# Patient Record
Sex: FEMALE | Race: WHITE | NOT HISPANIC OR LATINO | Employment: OTHER | URBAN - METROPOLITAN AREA
[De-identification: names, ages, dates, MRNs, and addresses within clinical notes are randomized per-mention and may not be internally consistent; named-entity substitution may affect disease eponyms.]

---

## 2017-01-20 ENCOUNTER — TRANSCRIBE ORDERS (OUTPATIENT)
Dept: ADMINISTRATIVE | Facility: HOSPITAL | Age: 77
End: 2017-01-20

## 2017-01-20 DIAGNOSIS — N18.6 ANEMIA IN END-STAGE RENAL DISEASE (HCC): Primary | ICD-10-CM

## 2017-01-20 DIAGNOSIS — D63.1 ANEMIA IN END-STAGE RENAL DISEASE (HCC): Primary | ICD-10-CM

## 2017-01-24 ENCOUNTER — HOSPITAL ENCOUNTER (OUTPATIENT)
Dept: INFUSION THERAPY | Facility: HOSPITAL | Age: 77
Discharge: HOME | End: 2017-01-24
Attending: INTERNAL MEDICINE | Admitting: INTERNAL MEDICINE

## 2017-01-24 VITALS
SYSTOLIC BLOOD PRESSURE: 165 MMHG | RESPIRATION RATE: 16 BRPM | TEMPERATURE: 98.7 F | OXYGEN SATURATION: 99 % | DIASTOLIC BLOOD PRESSURE: 67 MMHG | HEART RATE: 74 BPM

## 2017-01-24 DIAGNOSIS — I48.0 PAROXYSMAL ATRIAL FIBRILLATION (HCC): ICD-10-CM

## 2017-01-24 DIAGNOSIS — D63.1 ERYTHROPOIETIN DEFICIENCY ANEMIA: Primary | ICD-10-CM

## 2017-01-24 LAB
ABO GROUP BLD: NORMAL
BLD GP AB SCN SERPL QL: NEGATIVE
RH BLD: POSITIVE

## 2017-01-24 PROCEDURE — A9270 NON-COVERED ITEM OR SERVICE: HCPCS | Performed by: INTERNAL MEDICINE

## 2017-01-24 PROCEDURE — 63710000001 AMIODARONE 200 MG TABLET: Performed by: INTERNAL MEDICINE

## 2017-01-24 PROCEDURE — 86900 BLOOD TYPING SEROLOGIC ABO: CPT

## 2017-01-24 PROCEDURE — 36415 COLL VENOUS BLD VENIPUNCTURE: CPT

## 2017-01-24 PROCEDURE — G0463 HOSPITAL OUTPT CLINIC VISIT: HCPCS

## 2017-01-24 PROCEDURE — 36430 TRANSFUSION BLD/BLD COMPNT: CPT

## 2017-01-24 PROCEDURE — 86920 COMPATIBILITY TEST SPIN: CPT

## 2017-01-24 PROCEDURE — P9016 RBC LEUKOCYTES REDUCED: HCPCS

## 2017-01-24 PROCEDURE — 63710000001 HYDROCODONE-ACETAMINOPHEN 7.5-325 MG TABLET: Performed by: INTERNAL MEDICINE

## 2017-01-24 PROCEDURE — 86901 BLOOD TYPING SEROLOGIC RH(D): CPT

## 2017-01-24 PROCEDURE — 63710000001 FUROSEMIDE 40 MG TABLET: Performed by: INTERNAL MEDICINE

## 2017-01-24 PROCEDURE — 86850 RBC ANTIBODY SCREEN: CPT

## 2017-01-24 RX ORDER — AMIODARONE HYDROCHLORIDE 200 MG/1
200 TABLET ORAL ONCE
Status: COMPLETED | OUTPATIENT
Start: 2017-01-24 | End: 2017-01-24

## 2017-01-24 RX ORDER — HYDROCODONE BITARTRATE AND ACETAMINOPHEN 7.5; 325 MG/1; MG/1
1 TABLET ORAL EVERY 4 HOURS PRN
Status: CANCELLED
Start: 2017-01-24 | End: 2017-02-03

## 2017-01-24 RX ORDER — FUROSEMIDE 40 MG/1
40 TABLET ORAL ONCE
Status: CANCELLED
Start: 2017-01-24 | End: 2017-01-24

## 2017-01-24 RX ORDER — AMIODARONE HYDROCHLORIDE 200 MG/1
200 TABLET ORAL ONCE
Status: CANCELLED
Start: 2017-01-24 | End: 2017-01-24

## 2017-01-24 RX ORDER — FUROSEMIDE 40 MG/1
40 TABLET ORAL ONCE
Status: COMPLETED | OUTPATIENT
Start: 2017-01-24 | End: 2017-01-24

## 2017-01-24 RX ORDER — HYDROCODONE BITARTRATE AND ACETAMINOPHEN 7.5; 325 MG/1; MG/1
1 TABLET ORAL EVERY 4 HOURS PRN
Status: DISCONTINUED | OUTPATIENT
Start: 2017-01-24 | End: 2017-01-26 | Stop reason: HOSPADM

## 2017-01-24 RX ADMIN — AMIODARONE HYDROCHLORIDE 200 MG: 200 TABLET ORAL at 11:54

## 2017-01-24 RX ADMIN — HYDROCODONE BITARTRATE AND ACETAMINOPHEN 1 TABLET: 7.5; 325 TABLET ORAL at 11:53

## 2017-01-24 RX ADMIN — FUROSEMIDE 40 MG: 40 TABLET ORAL at 11:54

## 2017-01-24 NOTE — IP AVS SNAPSHOT
[facility] CARE  [phone]                    Brooklyn Galvan   1/24/2017  7:30 AM   BLOOD TRANSFUSION    Provider:  [facility]   Department:  [facility] CARE   Dept Phone:  [phone]                Your Full Care Plan           Instructions    Blood Transfusion, Care After  These instructions give you information about caring for yourself after your procedure. Your doctor may also give you more specific instructions. Call your doctor if you have any problems or questions after your procedure.   HOME CARE  · Take medicines only as told by your doctor. Ask your doctor if you can take an over-the-counter pain reliever if you have a fever or headache a day or two after your procedure.  · Return to your normal activities as told by your doctor.  GET HELP IF:   · You develop redness or irritation at your IV site.  · You have a fever, chills, or a headache that does not go away.  · Your pee (urine) is darker than normal.  · Your urine turns:    Pink.    Red.    Brown.  · The white part of your eye turns yellow (jaundice).  · You feel weak after doing your normal activities.  GET HELP RIGHT AWAY IF:   · You have trouble breathing.  · You have fever and chills and you also have:    Anxiety.    Chest or back pain.    Flushed or pink skin.    Clammy or sweaty skin.    A fast heartbeat.    A sick feeling in your stomach (nausea).     This information is not intended to replace advice given to you by your health care provider. Make sure you discuss any questions you have with your health care provider.     Document Released: 01/08/2016 Document Reviewed: 01/08/2016  Elsevier Interactive Patient Education ©2016 [employer]            Your Updated Medication List      ASK your doctor about these medications     amiodarone 200 MG tablet   Commonly known as:  PACERONE       aspirin 81 MG tablet       atorvastatin 10 MG tablet   Commonly known as:  LIPITOR       b  complex-C-folic acid 1 MG capsule       bimatoprost 0.01 % ophthalmic drops   Commonly known as:  LUMIGAN       bisacodyl 5 MG EC tablet   Commonly known as:  DULCOLAX       carvedilol 6.25 MG tablet   Commonly known as:  COREG       furosemide 40 MG tablet   Commonly known as:  LASIX       lisinopril 5 MG tablet   Commonly known as:  PRINIVIL,ZESTRIL       LORazepam 1 MG tablet   Commonly known as:  ATIVAN   Take 1 tablet by mouth 2 (Two) Times a Day As Needed for anxiety. 1/2 to 1 in am and 1 pm prn anxiety.       PARoxetine 40 MG tablet   Commonly known as:  PAXIL       triamcinolone 0.1 % cream   Commonly known as:  KENALOG   Wipe bottom with bid as needed hemorrhoids       Vitamin D3 52664 UNITS capsule       warfarin 5 MG tablet   Commonly known as:  COUMADIN               [employer] Signup     Our records indicate that your [employer] account has been deactivated. If you would like to reactivate your account, please email [email or web address] or call [phone] to talk to our [employer] staff.             Other Info from Your Visit           Allergies     Iodinated Diagnostic Agents Hives    Penicillins Swelling    Acetaminophen-codeine     Sulfa Antibiotics     Acetaminophen Anxiety    Promethazine Anxiety      Reason for Visit     Outpatient Infusion blood transfusions      Vital Signs     Blood Pressure Pulse Temperature Respirations Smoking Status       145/41 78 98.1 °F (36.7 °C) (Oral) 20 Former Smoker       Discharge Instructions     None         SYMPTOMS OF A STROKE    Call 911 or have someone take you to the Emergency Department if you have any of the following:    · Sudden numbness or weakness of your face, arm or leg especially on one side of the body  · Sudden confusion, diffiiculty speaking or trouble understanding   · Changes in your vision or loss of sight in one eye  · Sudden severe headache with no known cause  · sudden dizziness, trouble walking, loss of balance or  coordination    It is important to seek emergency care right away if you have further stroke symptoms. If you get emergency help quickly, the powerful clot-dissolving medicines can reduce the disabilities caused by a stroke.     For more information:    American Stroke Association  8-183-6-STROKE  www.strokeassociation.org           IF YOU SMOKE OR USE TOBACCO PLEASE READ THE FOLLOWING:    Why is smoking bad for me?  Smoking increases the risk of heart disease, lung disease, vascular disease, stroke, and cancer.     If you smoke, STOP!    If you would like more information on quitting smoking, please visit the [employer] website: [email or web address]   This link will provide additional resources including the QUIT line and the [employer] support groups.     For more information:    American Cancer Society  ([phone] [phone]    American Heart Association  2-449-298-3132

## 2017-01-24 NOTE — PATIENT INSTRUCTIONS
Blood Transfusion, Care After  These instructions give you information about caring for yourself after your procedure. Your doctor may also give you more specific instructions. Call your doctor if you have any problems or questions after your procedure.   HOME CARE  · Take medicines only as told by your doctor. Ask your doctor if you can take an over-the-counter pain reliever if you have a fever or headache a day or two after your procedure.  · Return to your normal activities as told by your doctor.  GET HELP IF:   · You develop redness or irritation at your IV site.  · You have a fever, chills, or a headache that does not go away.  · Your pee (urine) is darker than normal.  · Your urine turns:    Pink.    Red.    Brown.  · The white part of your eye turns yellow (jaundice).  · You feel weak after doing your normal activities.  GET HELP RIGHT AWAY IF:   · You have trouble breathing.  · You have fever and chills and you also have:    Anxiety.    Chest or back pain.    Flushed or pink skin.    Clammy or sweaty skin.    A fast heartbeat.    A sick feeling in your stomach (nausea).     This information is not intended to replace advice given to you by your health care provider. Make sure you discuss any questions you have with your health care provider.     Document Released: 01/08/2016 Document Reviewed: 01/08/2016  [employer] Interactive Patient Education ©2016 [employer]

## 2017-01-24 NOTE — PROGRESS NOTES
Tolerated both transfusions with no concerns or evidence of reaction. Medicated once for pain in back; see MAR for details. Report called to Maryann at [facility] at 1500. Discharged home per wheel chair with daughter at 1510.

## 2017-01-25 LAB
ABO + RH BLD: NORMAL
ABO + RH BLD: NORMAL
BH BB BLOOD EXPIRATION DATE: NORMAL
BH BB BLOOD EXPIRATION DATE: NORMAL
BH BB BLOOD TYPE BARCODE: 5100
BH BB BLOOD TYPE BARCODE: 5100
BH BB DISPENSE STATUS: NORMAL
BH BB DISPENSE STATUS: NORMAL
BH BB PRODUCT CODE: NORMAL
BH BB PRODUCT CODE: NORMAL
BH BB UNIT NUMBER: NORMAL
BH BB UNIT NUMBER: NORMAL
CROSSMATCH INTERPRETATION: NORMAL
CROSSMATCH INTERPRETATION: NORMAL
UNIT  ABO: NORMAL
UNIT  ABO: NORMAL
UNIT  RH: NORMAL
UNIT  RH: NORMAL

## 2017-02-01 ENCOUNTER — TELEPHONE (OUTPATIENT)
Dept: FAMILY MEDICINE CLINIC | Facility: CLINIC | Age: 77
End: 2017-02-01

## 2017-02-01 RX ORDER — WARFARIN SODIUM 3 MG/1
TABLET ORAL
Qty: 30 TABLET | Refills: 3 | Status: SHIPPED | OUTPATIENT
Start: 2017-02-01 | End: 2017-07-16 | Stop reason: SDUPTHER

## 2017-02-01 NOTE — TELEPHONE ENCOUNTER
Shiloh from [facility] called today and said pts PT/INR is 19.7-1.6.  Pt has been taking 3mg coumadin daily.  Per Negin, pt is to take 5mg for 2 days and 3mg all the rest and recheck in 1 week.  [facility] also said they would like an order to do a CBC on pt because her HGB was 5 when she was in the hospital and at discharge it was a 7.  Negin gave the okay to check CBC. Shiloh from [facility] is aware of this.

## 2017-03-09 ENCOUNTER — TELEPHONE (OUTPATIENT)
Dept: FAMILY MEDICINE CLINIC | Facility: CLINIC | Age: 77
End: 2017-03-09

## 2017-03-13 ENCOUNTER — OFFICE VISIT (OUTPATIENT)
Dept: FAMILY MEDICINE CLINIC | Facility: CLINIC | Age: 77
End: 2017-03-13

## 2017-03-13 VITALS
HEART RATE: 75 BPM | DIASTOLIC BLOOD PRESSURE: 50 MMHG | OXYGEN SATURATION: 95 % | TEMPERATURE: 98.4 F | SYSTOLIC BLOOD PRESSURE: 84 MMHG

## 2017-03-13 DIAGNOSIS — I42.8 NONISCHEMIC CARDIOMYOPATHY (HCC): ICD-10-CM

## 2017-03-13 DIAGNOSIS — Z99.2 END-STAGE RENAL DISEASE ON HEMODIALYSIS (HCC): ICD-10-CM

## 2017-03-13 DIAGNOSIS — N18.5 ANEMIA OF CHRONIC RENAL FAILURE, STAGE 5 (HCC): ICD-10-CM

## 2017-03-13 DIAGNOSIS — E11.22 TYPE 2 DIABETES MELLITUS WITH CHRONIC KIDNEY DISEASE ON CHRONIC DIALYSIS, WITHOUT LONG-TERM CURRENT USE OF INSULIN (HCC): ICD-10-CM

## 2017-03-13 DIAGNOSIS — M86.9 OSTEOMYELITIS OF METATARSAL (HCC): ICD-10-CM

## 2017-03-13 DIAGNOSIS — D63.1 ANEMIA OF CHRONIC RENAL FAILURE, STAGE 5 (HCC): ICD-10-CM

## 2017-03-13 DIAGNOSIS — Z01.818 PREOPERATIVE CLEARANCE: Primary | ICD-10-CM

## 2017-03-13 DIAGNOSIS — N18.6 TYPE 2 DIABETES MELLITUS WITH CHRONIC KIDNEY DISEASE ON CHRONIC DIALYSIS, WITHOUT LONG-TERM CURRENT USE OF INSULIN (HCC): ICD-10-CM

## 2017-03-13 DIAGNOSIS — I10 BENIGN ESSENTIAL HYPERTENSION: ICD-10-CM

## 2017-03-13 DIAGNOSIS — I48.0 PAROXYSMAL ATRIAL FIBRILLATION (HCC): ICD-10-CM

## 2017-03-13 DIAGNOSIS — Z99.2 TYPE 2 DIABETES MELLITUS WITH CHRONIC KIDNEY DISEASE ON CHRONIC DIALYSIS, WITHOUT LONG-TERM CURRENT USE OF INSULIN (HCC): ICD-10-CM

## 2017-03-13 DIAGNOSIS — N18.6 END-STAGE RENAL DISEASE ON HEMODIALYSIS (HCC): ICD-10-CM

## 2017-03-13 DIAGNOSIS — I27.20 PULMONARY HYPERTENSION (HCC): ICD-10-CM

## 2017-03-13 DIAGNOSIS — I34.0 NON-RHEUMATIC MITRAL REGURGITATION: ICD-10-CM

## 2017-03-13 DIAGNOSIS — Z79.01 CHRONIC ANTICOAGULATION: ICD-10-CM

## 2017-03-13 DIAGNOSIS — I25.10 CORONARY ARTERY DISEASE INVOLVING NATIVE CORONARY ARTERY OF NATIVE HEART WITHOUT ANGINA PECTORIS: ICD-10-CM

## 2017-03-13 DIAGNOSIS — G47.33 OSA (OBSTRUCTIVE SLEEP APNEA): ICD-10-CM

## 2017-03-13 PROCEDURE — 99214 OFFICE O/P EST MOD 30 MIN: CPT | Performed by: FAMILY MEDICINE

## 2017-03-13 RX ORDER — AMPICILLIN 500 MG/1
500 CAPSULE ORAL 4 TIMES DAILY
COMMUNITY
End: 2017-06-29

## 2017-03-13 RX ORDER — LIDOCAINE 50 MG/G
1 PATCH TOPICAL EVERY 24 HOURS
COMMUNITY
End: 2017-06-29

## 2017-03-13 RX ORDER — LISINOPRIL 20 MG/1
20 TABLET ORAL DAILY
COMMUNITY
End: 2017-06-29 | Stop reason: ALTCHOICE

## 2017-03-13 RX ORDER — CALCIUM ACETATE 667 MG/1
CAPSULE ORAL
Refills: 5 | COMMUNITY
Start: 2017-01-20 | End: 2017-06-29 | Stop reason: ALTCHOICE

## 2017-03-13 RX ORDER — HYDROCODONE BITARTRATE AND ACETAMINOPHEN 7.5; 325 MG/1; MG/1
1 TABLET ORAL EVERY 6 HOURS PRN
COMMUNITY
End: 2017-06-29 | Stop reason: DRUGHIGH

## 2017-03-13 RX ORDER — DOCUSATE SODIUM 100 MG/1
100 CAPSULE, LIQUID FILLED ORAL 2 TIMES DAILY
COMMUNITY
End: 2017-06-22 | Stop reason: SDUPTHER

## 2017-03-13 NOTE — PROGRESS NOTES
Subjective   Brooklyn Galvan is a 77 y.o. female. Presents today for   Chief Complaint   Patient presents with   • Surgical Clearance     pt is here for surgical clearance for left foot surgery.   Patient here for surgical clearance.    Surgery:  Left foot metarsal osteomyelitis and non-healing diabetic ulcer.  Surgeon:  Dr. Harpal Maria  Tentatively scheduled Thursday, 3/16/17    Patient with history of Type 2 diabetes, diet controlled, hypertension, dyslipidemia and end stage renal disease on hemodialysis for past 5 years with runs on M, W & F, BP down today but relates just left dialysis and taking larger volume off (lower dry weight).   Patient history of a fib on chronic anticoagulation with recent cessation as to have surgery in 4 days.  Last Coumadin dose 3/10/17.  Patient recently inpatient at [facility] for hypoxic respiratory failure though to be secondary to pneumonia.  However was found to be in CHF with pulmonary edema felt to be multifactorial.  This resolved after dialysis with large volume dialyzed.  Was discharged with plan to keep lower dry weight.  Did have borderline troponin peaked at 0.28 and Cardiology was consulted.  It opted to perform left and right heart catheterization on 2/27/2017.  No significant coronary artery pathology noted (Normal left main, left circumflex, right coronary and LAD;  First Diagonal had 60% stenosis, had significant 4+ mitral regurgitation with markedly dilated left atrium).  Moderate pulmonary hypertension with RVSP 57mmHg noted as well.  LV EF 20-30%.  Patient was also noted to have ESBL in urine, was treated with merrem, though patient asx and coloncy count 20-30k.  Prior to this admission patient also had admit December 2016 for rectal bleeding while on coumadin.  She did have a colonoscopy that noted ileocecal mass and multiple polyps.  There was a microperforation noted at bx site and ultimately had right sided belle-colectomy.  Pathology per recent d/c notes was  tubular adenoma high grade dysplasia.  Patient was transfused PRBC at that time     Patient currently denying chest pain, SOA, PND or orthopnea.  Also denies syncope, heart racing and no light headed or dizzy sensation.  No abdominal pain or nausea or vomiting.  No diarrhea.  No current urinary symptoms of dysuria or burning with urination (had UTI recent hospitalization).    Patient has had prior general anesthesia without issues.  Patient has ANDREA with moderate pulmonary hypertension, does not wear cpap at night.  No history of DVT/VTE or liver disease. No prior AMI or CVA.        History of Present Illness    Review of Systems   Constitutional: Negative for chills and fever.   HENT: Negative for congestion, ear pain and sinus pressure.    Eyes: Negative for pain and visual disturbance.   Respiratory: Negative for cough, shortness of breath and wheezing.    Cardiovascular: Negative for chest pain, palpitations and leg swelling.   Gastrointestinal: Negative for abdominal pain, blood in stool, constipation, diarrhea, nausea and vomiting.   Endocrine: Negative for polydipsia and polyuria.   Genitourinary: Negative for dysuria and frequency.   Musculoskeletal: Negative for arthralgias and joint swelling.   Skin: Positive for wound. Negative for rash.   Neurological: Negative for dizziness, syncope, facial asymmetry, speech difficulty, weakness, light-headedness and headaches.   Hematological: Negative for adenopathy. Bruises/bleeds easily (with coumadin).   Psychiatric/Behavioral: Negative for sleep disturbance. The patient is not nervous/anxious.        The following portions of the patient's history were reviewed and updated as appropriate: allergies, current medications, past family history, past medical history, past social history, past surgical history and problem list.    Patient Active Problem List   Diagnosis   • Vitamin D deficiency   • Anemia of chronic renal failure   • Benign essential hypertension   •  Chronic kidney disease, stage V   • Constipation   • Type 2 diabetes mellitus with chronic kidney disease on chronic dialysis, without long-term current use of insulin   • Dyslipidemia   • Generalized anxiety disorder   • Low back pain   • Post-menopausal osteoporosis   • End-stage renal disease on hemodialysis   • Paroxysmal atrial fibrillation   • Coronary artery disease involving native coronary artery   • Chronic anticoagulation   • Non-rheumatic mitral regurgitation   • Pulmonary hypertension   • ANDREA (obstructive sleep apnea)   • Nonischemic cardiomyopathy       Allergies   Allergen Reactions   • Iodinated Diagnostic Agents Hives   • Penicillins Swelling   • Acetaminophen-Codeine    • Sulfa Antibiotics    • Acetaminophen Anxiety   • Promethazine Anxiety       Current Outpatient Prescriptions on File Prior to Visit   Medication Sig Dispense Refill   • amiodarone (PACERONE) 200 MG tablet      • atorvastatin (LIPITOR) 10 MG tablet Take 10 mg by mouth daily. ONE QD FOR CHOLESTEROL & HEART HEALTH     • b complex-C-folic acid 1 MG capsule Take 1 capsule by mouth daily.     • bimatoprost (LUMIGAN) 0.01 % ophthalmic drops Apply 1 drop to eye daily.     • bisacodyl (DULCOLAX) 5 MG EC tablet Take by mouth. ONE TID AS NEEDED     • carvedilol (COREG) 6.25 MG tablet 6.25 mg 2 (Two) Times a Day With Meals.     • Cholecalciferol (VITAMIN D3) 86905 UNITS capsule Take 50,000 Units by mouth every 7 days. \     • furosemide (LASIX) 40 MG tablet Take 40 mg by mouth 2 (two) times a day.     • LORazepam (ATIVAN) 1 MG tablet Take 1 tablet by mouth 2 (Two) Times a Day As Needed for anxiety. 1/2 to 1 in am and 1 pm prn anxiety. 60 tablet 2   • warfarin (COUMADIN) 3 MG tablet Use as directed by prescriber. 30 tablet 3   • warfarin (COUMADIN) 5 MG tablet      • [DISCONTINUED] aspirin 81 MG tablet Take 1 tablet by mouth daily.     • [DISCONTINUED] lisinopril (PRINIVIL,ZESTRIL) 5 MG tablet      • [DISCONTINUED] PARoxetine (PAXIL) 40 MG  tablet Take by mouth. ONE & ONE-HALF QD FOR MOOD AND WELL-BEING     • [DISCONTINUED] triamcinolone (KENALOG) 0.1 % cream Wipe bottom with bid as needed hemorrhoids 45 g 5     No current facility-administered medications on file prior to visit.      Past Medical History   Diagnosis Date   • Anemia    • Anxiety disorder    • Chronic kidney disease    • Depressive disorder    • Diabetes mellitus    • Dialysis patient    • Glaucoma    •  2 para 2    • Hyperlipidemia    • Menarche    • MRSA (methicillin resistant staph aureus) culture positive    • UTI (urinary tract infection)    • Vitamin D deficiency          Objective   Vitals:    17 1231   BP: (!) 84/50   BP Location: Left arm   Patient Position: Sitting   Cuff Size: Large Adult   Pulse: 75   Temp: 98.4 °F (36.9 °C)   TempSrc: Oral   SpO2: 95%       Physical Exam   Constitutional: She appears well-developed and well-nourished.   HENT:   Head: Normocephalic and atraumatic.   Right Ear: Tympanic membrane normal.   Left Ear: Tympanic membrane normal.   Nose: Nose normal.   Mouth/Throat: Oropharynx is clear and moist and mucous membranes are normal.   Eyes: EOM are normal. Pupils are equal, round, and reactive to light.   Neck: Neck supple. No JVD present. No thyromegaly present.   Cardiovascular: Normal rate and regular rhythm.  Exam reveals no gallop and no friction rub.    Murmur heard.  Pulmonary/Chest: Effort normal and breath sounds normal. No respiratory distress. She has no wheezes. She has no rales.   Abdominal: Soft. Bowel sounds are normal. She exhibits no distension. There is no tenderness. There is no rebound and no guarding.   Musculoskeletal: She exhibits no edema.   Neurological: She is alert. No cranial nerve deficit. Coordination normal.   Skin: Skin is warm and dry.   Psychiatric: She has a normal mood and affect. Her behavior is normal.   Nursing note and vitals reviewed.    RADIOLOGY REPORT        FACILITY:  [facility]     UNIT/AGE/GENDER: JERIRAD  OP      AGE:77 Y          SEX:F    PATIENT NAME/:  ULI MILAN B    1940    UNIT NUMBER:  [id number]    ACCOUNT NUMBER:  [id number]    ACCESSION NUMBER:  [id number]        EXAMINATION: XR FOOT COMP MIN 3 VWS LT        DATE: 2017        HISTORY: Left lateral foot ulcer.        COMPARISON: MRI dated 2016        FINDINGS: 3 nonweightbearing views of the left foot demonstrate an ulcer of the lateral forefoot. Dressing material is in place which obscures underlying fine bone detail. There is deficiency of the 5th metatarsal head. This may be postsurgical or the     sequelae of prior osteomyelitis. There is subtle irregularity of the distal margin which is in keeping with ongoing osteomyelitis. There has been prior amputation of the 2nd toe. There is metatarsus primus varus and hallux valgus with lateral subluxation    of the 1st metatarsophalangeal joint. There is moderate midfoot osteoarthritis. Heterotopic ossification is present at the Achilles insertion. A large heel spur is noted.        IMPRESSION:     1. Lateral forefoot ulcer. There is deficiency of the underlying 5th metatarsal head which may be postsurgical or the sequelae of prior osteomyelitis. There is subtle irregularity of the distal margin in keeping with ongoing osteomyelitis.    2. Prior amputation of the 2nd toe.    3. Metatarsus primus varus and hallux valgus with lateral 1st metatarsophalangeal joint subluxation.    4. Moderate midfoot osteoarthritis.        Dictated by: Cody Dickinson M.D.        Images and Report reviewed and interpreted by: Cody Dickinson M.D.        <PS><Electronically signed by: Cody Dickinson M.D.>    2017 0941    Assessment/Plan   Uli was seen today for surgical clearance.    Diagnoses and all orders for this visit:    Preoperative clearance  -     Comprehensive Metabolic Panel  -     CBC & Differential  -     Protime-INR  -     APTT    Paroxysmal atrial  fibrillation  -     Protime-INR  -     APTT    Benign essential hypertension    Anemia of chronic renal failure, stage 5  -     CBC & Differential    End-stage renal disease on hemodialysis  -     CBC & Differential    Type 2 diabetes mellitus with chronic kidney disease on chronic dialysis, without long-term current use of insulin  -     Hemoglobin A1c    Osteomyelitis of metatarsal  -     Comprehensive Metabolic Panel  -     CBC & Differential  -     Protime-INR  -     APTT    Chronic anticoagulation  -     CBC & Differential  -     Protime-INR  -     APTT    Non-rheumatic mitral regurgitation    Nonischemic cardiomyopathy    ANDREA (obstructive sleep apnea)    Pulmonary hypertension    Patient is due for recheck of labs and will check preoperatively including coags.   Patient is high risk with multiple risk factors, however has osteomyelitis and unlikely to heal placing at high risk for limb loss.  As long as labs stable she is medically cleared for surgery.  Patient does have untreated ANDREA with pulmonary hypertension and I would notify anesthesia of this.  I also recommend clear with Cardiology any perioperative recommendations regarding cardiac risks as has significant mitral valvular regurgitation reported.         -Follow up: 3 months       Current Outpatient Prescriptions:   •  amiodarone (PACERONE) 200 MG tablet, , Disp: , Rfl:   •  ampicillin (PRINCIPEN) 500 MG capsule, Take 500 mg by mouth 4 (Four) Times a Day., Disp: , Rfl:   •  atorvastatin (LIPITOR) 10 MG tablet, Take 10 mg by mouth daily. ONE QD FOR CHOLESTEROL & HEART HEALTH, Disp: , Rfl:   •  b complex-C-folic acid 1 MG capsule, Take 1 capsule by mouth daily., Disp: , Rfl:   •  bimatoprost (LUMIGAN) 0.01 % ophthalmic drops, Apply 1 drop to eye daily., Disp: , Rfl:   •  bisacodyl (DULCOLAX) 5 MG EC tablet, Take by mouth. ONE TID AS NEEDED, Disp: , Rfl:   •  calcium acetate (PHOS BINDER,) 667 MG capsule capsule, TAKE 1 CAPSULE BY MOUTH THREE TIMES DAILY  WITH MEALS, Disp: , Rfl: 5  •  carvedilol (COREG) 6.25 MG tablet, 6.25 mg 2 (Two) Times a Day With Meals., Disp: , Rfl:   •  Cholecalciferol (VITAMIN D3) 86021 UNITS capsule, Take 50,000 Units by mouth every 7 days. \, Disp: , Rfl:   •  docusate sodium (COLACE) 100 MG capsule, Take 100 mg by mouth 2 (Two) Times a Day., Disp: , Rfl:   •  Enoxaparin Sodium (LOVENOX SC), Inject  under the skin., Disp: , Rfl:   •  furosemide (LASIX) 40 MG tablet, Take 40 mg by mouth 2 (two) times a day., Disp: , Rfl:   •  HYDROcodone-acetaminophen (NORCO) 7.5-325 MG per tablet, Take 1 tablet by mouth Every 6 (Six) Hours As Needed for moderate pain (4-6)., Disp: , Rfl:   •  lidocaine (LIDODERM) 5 %, Place 1 patch on the skin Daily. Remove & Discard patch within 12 hours or as directed by MD, Disp: , Rfl:   •  lisinopril (PRINIVIL,ZESTRIL) 20 MG tablet, Take 20 mg by mouth Daily., Disp: , Rfl:   •  LORazepam (ATIVAN) 1 MG tablet, Take 1 tablet by mouth 2 (Two) Times a Day As Needed for anxiety. 1/2 to 1 in am and 1 pm prn anxiety., Disp: 60 tablet, Rfl: 2  •  MEROPENEM IV, Infuse  into a venous catheter., Disp: , Rfl:   •  PARoxetine HCl (PAXIL PO), Take 60 mg by mouth Daily., Disp: , Rfl:   •  warfarin (COUMADIN) 3 MG tablet, Use as directed by prescriber., Disp: 30 tablet, Rfl: 3  •  warfarin (COUMADIN) 5 MG tablet, , Disp: , Rfl:

## 2017-03-14 LAB
ALBUMIN SERPL-MCNC: 4.1 G/DL (ref 3.5–5.2)
ALBUMIN/GLOB SERPL: 1.5 G/DL
ALP SERPL-CCNC: 110 U/L (ref 39–117)
ALT SERPL-CCNC: 33 U/L (ref 1–33)
APTT PPP: 33 SECONDS (ref 22.7–35.4)
AST SERPL-CCNC: 34 U/L (ref 1–32)
BASOPHILS # BLD AUTO: 0.05 10*3/MM3 (ref 0–0.2)
BASOPHILS NFR BLD AUTO: 0.5 % (ref 0–1.5)
BILIRUB SERPL-MCNC: 0.7 MG/DL (ref 0.1–1.2)
BUN SERPL-MCNC: 28 MG/DL (ref 8–23)
BUN/CREAT SERPL: 4.2 (ref 7–25)
CALCIUM SERPL-MCNC: 9.1 MG/DL (ref 8.6–10.5)
CHLORIDE SERPL-SCNC: 97 MMOL/L (ref 98–107)
CO2 SERPL-SCNC: 21.2 MMOL/L (ref 22–29)
CREAT SERPL-MCNC: 6.68 MG/DL (ref 0.57–1)
DIFFERENTIAL COMMENT: NORMAL
EOSINOPHIL # BLD AUTO: 0.12 10*3/MM3 (ref 0–0.7)
EOSINOPHIL NFR BLD AUTO: 1.1 % (ref 0.3–6.2)
ERYTHROCYTE [DISTWIDTH] IN BLOOD BY AUTOMATED COUNT: 21.8 % (ref 11.7–13)
GLOBULIN SER CALC-MCNC: 2.8 GM/DL
GLUCOSE SERPL-MCNC: 108 MG/DL (ref 65–99)
HBA1C MFR BLD: 4.7 % (ref 4.8–5.6)
HCT VFR BLD AUTO: 37.6 % (ref 35.6–45.5)
HGB BLD-MCNC: 11.7 G/DL (ref 11.9–15.5)
IMM GRANULOCYTES # BLD: 0.03 10*3/MM3 (ref 0–0.03)
IMM GRANULOCYTES NFR BLD: 0.3 % (ref 0–0.5)
LYMPHOCYTES # BLD AUTO: 1.44 10*3/MM3 (ref 0.9–4.8)
LYMPHOCYTES NFR BLD AUTO: 13.7 % (ref 19.6–45.3)
MCH RBC QN AUTO: 31.7 PG (ref 26.9–32)
MCHC RBC AUTO-ENTMCNC: 31.1 G/DL (ref 32.4–36.3)
MCV RBC AUTO: 101.9 FL (ref 80.5–98.2)
MONOCYTES # BLD AUTO: 0.84 10*3/MM3 (ref 0.2–1.2)
MONOCYTES NFR BLD AUTO: 8 % (ref 5–12)
NEUTROPHILS # BLD AUTO: 8.02 10*3/MM3 (ref 1.9–8.1)
NEUTROPHILS NFR BLD AUTO: 76.4 % (ref 42.7–76)
NRBC BLD AUTO-RTO: 2.1 /100 WBC (ref 0–0)
PLATELET # BLD AUTO: 178 10*3/MM3 (ref 140–500)
PLATELET BLD QL SMEAR: NORMAL
POTASSIUM SERPL-SCNC: 4.8 MMOL/L (ref 3.5–5.2)
PROT SERPL-MCNC: 6.9 G/DL (ref 6–8.5)
RBC # BLD AUTO: 3.69 10*6/MM3 (ref 3.9–5.2)
RBC MORPH BLD: NORMAL
SODIUM SERPL-SCNC: 142 MMOL/L (ref 136–145)
WBC # BLD AUTO: 10.5 10*3/MM3 (ref 4.5–10.7)

## 2017-03-15 ENCOUNTER — TELEPHONE (OUTPATIENT)
Dept: FAMILY MEDICINE CLINIC | Facility: CLINIC | Age: 77
End: 2017-03-15

## 2017-03-15 NOTE — TELEPHONE ENCOUNTER
----- Message from Michael Hernadez DO sent at 3/14/2017  8:56 PM EDT -----  Please fax labs to Dr. Maria for surgery 3/16/17 and let patient know anemia improved.  Hgb went from 8 to over 11.

## 2017-03-15 NOTE — PROGRESS NOTES
Please fax labs to Dr. Maria for surgery 3/16/17 and let patient know anemia improved.  Hgb went from 8 to over 11.

## 2017-05-26 ENCOUNTER — TELEPHONE (OUTPATIENT)
Dept: FAMILY MEDICINE CLINIC | Facility: CLINIC | Age: 77
End: 2017-05-26

## 2017-06-21 ENCOUNTER — TELEPHONE (OUTPATIENT)
Dept: FAMILY MEDICINE CLINIC | Facility: CLINIC | Age: 77
End: 2017-06-21

## 2017-06-21 DIAGNOSIS — F41.9 ANXIETY: ICD-10-CM

## 2017-06-21 RX ORDER — LORAZEPAM 1 MG/1
1 TABLET ORAL 2 TIMES DAILY PRN
Qty: 60 TABLET | Refills: 2 | OUTPATIENT
Start: 2017-06-21 | End: 2017-12-07 | Stop reason: SDUPTHER

## 2017-06-22 RX ORDER — DOCUSATE SODIUM 100 MG/1
CAPSULE ORAL
Qty: 60 CAPSULE | Refills: 10 | Status: SHIPPED | OUTPATIENT
Start: 2017-06-22 | End: 2018-07-05 | Stop reason: SDUPTHER

## 2017-06-29 ENCOUNTER — OFFICE VISIT (OUTPATIENT)
Dept: FAMILY MEDICINE CLINIC | Facility: CLINIC | Age: 77
End: 2017-06-29

## 2017-06-29 VITALS
SYSTOLIC BLOOD PRESSURE: 144 MMHG | DIASTOLIC BLOOD PRESSURE: 62 MMHG | BODY MASS INDEX: 33.59 KG/M2 | WEIGHT: 209 LBS | OXYGEN SATURATION: 97 % | TEMPERATURE: 98.3 F | HEART RATE: 65 BPM | HEIGHT: 66 IN

## 2017-06-29 DIAGNOSIS — E78.5 DYSLIPIDEMIA: ICD-10-CM

## 2017-06-29 DIAGNOSIS — N18.5 CHRONIC KIDNEY DISEASE, STAGE V (HCC): ICD-10-CM

## 2017-06-29 DIAGNOSIS — Z12.39 BREAST CANCER SCREENING: ICD-10-CM

## 2017-06-29 DIAGNOSIS — Z12.31 ENCOUNTER FOR SCREENING MAMMOGRAM FOR MALIGNANT NEOPLASM OF BREAST: ICD-10-CM

## 2017-06-29 DIAGNOSIS — E11.22 TYPE 2 DIABETES MELLITUS WITH CHRONIC KIDNEY DISEASE ON CHRONIC DIALYSIS, WITHOUT LONG-TERM CURRENT USE OF INSULIN (HCC): ICD-10-CM

## 2017-06-29 DIAGNOSIS — Z99.2 TYPE 2 DIABETES MELLITUS WITH CHRONIC KIDNEY DISEASE ON CHRONIC DIALYSIS, WITHOUT LONG-TERM CURRENT USE OF INSULIN (HCC): ICD-10-CM

## 2017-06-29 DIAGNOSIS — F41.9 ANXIETY: Primary | ICD-10-CM

## 2017-06-29 DIAGNOSIS — N18.6 TYPE 2 DIABETES MELLITUS WITH CHRONIC KIDNEY DISEASE ON CHRONIC DIALYSIS, WITHOUT LONG-TERM CURRENT USE OF INSULIN (HCC): ICD-10-CM

## 2017-06-29 DIAGNOSIS — I25.10 CORONARY ARTERY DISEASE INVOLVING NATIVE CORONARY ARTERY OF NATIVE HEART WITHOUT ANGINA PECTORIS: ICD-10-CM

## 2017-06-29 DIAGNOSIS — I10 BENIGN ESSENTIAL HYPERTENSION: ICD-10-CM

## 2017-06-29 PROBLEM — E11.69 OSTEOMYELITIS DUE TO TYPE 2 DIABETES MELLITUS (HCC): Status: ACTIVE | Noted: 2017-03-16

## 2017-06-29 PROBLEM — M86.9 OSTEOMYELITIS DUE TO TYPE 2 DIABETES MELLITUS (HCC): Status: ACTIVE | Noted: 2017-03-16

## 2017-06-29 PROBLEM — E66.9 ADIPOSITY: Status: ACTIVE | Noted: 2017-06-29

## 2017-06-29 PROCEDURE — 99213 OFFICE O/P EST LOW 20 MIN: CPT | Performed by: FAMILY MEDICINE

## 2017-06-29 RX ORDER — GLIMEPIRIDE 2 MG/1
1 TABLET ORAL 2 TIMES DAILY
COMMUNITY

## 2017-06-29 RX ORDER — SEVELAMER CARBONATE 800 MG/1
800 TABLET, FILM COATED ORAL
COMMUNITY

## 2017-06-29 RX ORDER — HYDROCODONE BITARTRATE AND ACETAMINOPHEN 5; 325 MG/1; MG/1
1 TABLET ORAL EVERY 6 HOURS PRN
COMMUNITY
End: 2017-09-07 | Stop reason: SDUPTHER

## 2017-06-29 RX ORDER — ERGOCALCIFEROL 1.25 MG/1
50000 CAPSULE ORAL WEEKLY
COMMUNITY

## 2017-06-29 RX ORDER — LEVOTHYROXINE SODIUM 0.03 MG/1
25 TABLET ORAL DAILY
COMMUNITY
End: 2017-09-07

## 2017-06-29 NOTE — PROGRESS NOTES
Subjective   Brooklyn Galvan is a 77 y.o. female. Presents today for   Chief Complaint   Patient presents with   • Anxiety     pt here for medication refill   • Hypotension     pt was in hospital last month for hypotension, records are in media.   • Hypertension   • Hyperlipidemia   • Diabetes     Patient here for f/u;  Sees Endocrinology (Dr. Thorpe), Cardiology (Dr. Roberts), Nephrology (Dr. Jaime).    History of Present Illness  Patient here for f/u;  Off all bp medications as BP too low.  Reports bs doing well and off all meds;  Is still on dialysis for CKD V (ESRD) M,W,F;  Since last seen had toe amputation and partial foot 2ndary to osteomyelitis, reports no further wound.  No cp/soa; no abd pain no n/v;  Patient sees me mostly for anxiety;  Needs medications for anxiety; Taking and tolerating;  No syncope; no dizziness.  Is on warfarin mgy by cardiology for hx of a.fib.    Review of Systems   Respiratory: Negative for shortness of breath.    Cardiovascular: Negative for chest pain and palpitations.   Gastrointestinal: Negative for abdominal pain, nausea and vomiting.   Neurological: Negative for syncope.       The following portions of the patient's history were reviewed and updated as appropriate: allergies, current medications, past medical history, past surgical history and problem list.    Patient Active Problem List   Diagnosis   • Vitamin D deficiency   • Anemia of chronic renal failure   • Benign essential hypertension   • Chronic kidney disease, stage V   • Constipation   • Type 2 diabetes mellitus with chronic kidney disease on chronic dialysis, without long-term current use of insulin   • Dyslipidemia   • Generalized anxiety disorder   • Low back pain   • Post-menopausal osteoporosis   • End-stage renal disease on hemodialysis   • Paroxysmal atrial fibrillation   • Coronary artery disease involving native coronary artery   • Chronic anticoagulation   • Non-rheumatic mitral regurgitation   •  Pulmonary hypertension   • ANDREA (obstructive sleep apnea)   • Nonischemic cardiomyopathy   • Anxiety   • Adiposity   • Osteomyelitis due to type 2 diabetes mellitus       Allergies   Allergen Reactions   • Iodinated Diagnostic Agents Hives   • Penicillins Swelling   • Acetaminophen-Codeine    • Sulfa Antibiotics    • Acetaminophen Anxiety   • Promethazine Anxiety       Current Outpatient Prescriptions on File Prior to Visit   Medication Sig Dispense Refill   • amiodarone (PACERONE) 200 MG tablet 100 mg.     • atorvastatin (LIPITOR) 10 MG tablet Take 10 mg by mouth daily. ONE QD FOR CHOLESTEROL & HEART HEALTH     • b complex-C-folic acid 1 MG capsule Take 1 capsule by mouth daily.     • bimatoprost (LUMIGAN) 0.01 % ophthalmic drops Apply 1 drop to eye daily.     • DOCQLACE 100 MG capsule TAKE ONE CAPSULE BY MOUTH TWICE A DAY 60 capsule 10   • LORazepam (ATIVAN) 1 MG tablet Take 1 tablet by mouth 2 (Two) Times a Day As Needed for Anxiety. 1/2 to 1 in am and 1 pm prn anxiety. 60 tablet 2   • PARoxetine HCl (PAXIL PO) Take 60 mg by mouth Daily.     • warfarin (COUMADIN) 3 MG tablet Use as directed by prescriber. 30 tablet 3   • warfarin (COUMADIN) 5 MG tablet      • [DISCONTINUED] HYDROcodone-acetaminophen (NORCO) 7.5-325 MG per tablet Take 1 tablet by mouth Every 6 (Six) Hours As Needed for moderate pain (4-6).     • MEROPENEM IV Infuse  into a venous catheter.     • [DISCONTINUED] ampicillin (PRINCIPEN) 500 MG capsule Take 500 mg by mouth 4 (Four) Times a Day.     • [DISCONTINUED] bisacodyl (DULCOLAX) 5 MG EC tablet Take by mouth. ONE TID AS NEEDED     • [DISCONTINUED] calcium acetate (PHOS BINDER,) 667 MG capsule capsule TAKE 1 CAPSULE BY MOUTH THREE TIMES DAILY WITH MEALS  5   • [DISCONTINUED] carvedilol (COREG) 6.25 MG tablet 6.25 mg 2 (Two) Times a Day With Meals.     • [DISCONTINUED] Cholecalciferol (VITAMIN D3) 06898 UNITS capsule Take 50,000 Units by mouth every 7 days. \     • [DISCONTINUED] Enoxaparin Sodium  "(LOVENOX SC) Inject  under the skin.     • [DISCONTINUED] furosemide (LASIX) 40 MG tablet Take 40 mg by mouth 2 (two) times a day.     • [DISCONTINUED] lidocaine (LIDODERM) 5 % Place 1 patch on the skin Daily. Remove & Discard patch within 12 hours or as directed by MD     • [DISCONTINUED] lisinopril (PRINIVIL,ZESTRIL) 20 MG tablet Take 20 mg by mouth Daily.       No current facility-administered medications on file prior to visit.        Objective   Vitals:    06/29/17 1444   BP: 144/62   BP Location: Left arm   Patient Position: Sitting   Cuff Size: Large Adult   Pulse: 65   Temp: 98.3 °F (36.8 °C)   TempSrc: Oral   SpO2: 97%   Weight: 209 lb (94.8 kg)   Height: 65.5\" (166.4 cm)       Physical Exam   Constitutional: She is oriented to person, place, and time. She appears well-developed and well-nourished.   HENT:   Head: Normocephalic and atraumatic.   Neck: Neck supple. No JVD present. No thyromegaly present.   Cardiovascular: Normal rate, regular rhythm and normal heart sounds.  Exam reveals no gallop and no friction rub.    No murmur heard.  Pulmonary/Chest: Effort normal and breath sounds normal. No respiratory distress. She has no wheezes. She has no rales.   Abdominal: Soft. Bowel sounds are normal. She exhibits no distension. There is no tenderness. There is no rebound and no guarding.   Musculoskeletal: She exhibits no edema.   Neurological: She is alert and oriented to person, place, and time.   Skin: Skin is warm and dry.   Psychiatric: She has a normal mood and affect. Her behavior is normal.   Nursing note and vitals reviewed.      Assessment/Plan   Brooklyn was seen today for anxiety, hypotension, hypertension, hyperlipidemia and diabetes.    Diagnoses and all orders for this visit:    Anxiety    Coronary artery disease involving native coronary artery of native heart without angina pectoris    Benign essential hypertension    Type 2 diabetes mellitus with chronic kidney disease on chronic dialysis, " without long-term current use of insulin    Dyslipidemia    Chronic kidney disease, stage V    -dm2 - f/u with endo, mgmt lipids as well  -cad - f/u with cardiology  -ESRD - on HD;  No UDS able obtain as scanty urine and rare.  Signed N/A today for ativan;  I discussed risks of this medication with patient at length and normally do not recommend.  However, patient has ESRD on HD with poor long-term prognosis given co-morbidities and will continue for now. If desires to go off, discussed letting me know so could wean off, d/w potential serious risks of w/d.         -Follow up: 6 months       Current Outpatient Prescriptions:   •  amiodarone (PACERONE) 200 MG tablet, 100 mg., Disp: , Rfl:   •  atorvastatin (LIPITOR) 10 MG tablet, Take 10 mg by mouth daily. ONE QD FOR CHOLESTEROL & HEART HEALTH, Disp: , Rfl:   •  b complex-C-folic acid 1 MG capsule, Take 1 capsule by mouth daily., Disp: , Rfl:   •  bimatoprost (LUMIGAN) 0.01 % ophthalmic drops, Apply 1 drop to eye daily., Disp: , Rfl:   •  DOCQLACE 100 MG capsule, TAKE ONE CAPSULE BY MOUTH TWICE A DAY, Disp: 60 capsule, Rfl: 10  •  HYDROcodone-acetaminophen (NORCO) 5-325 MG per tablet, Take 1 tablet by mouth Every 6 (Six) Hours As Needed., Disp: , Rfl:   •  levothyroxine (SYNTHROID, LEVOTHROID) 25 MCG tablet, Take 25 mcg by mouth Daily., Disp: , Rfl:   •  LORazepam (ATIVAN) 1 MG tablet, Take 1 tablet by mouth 2 (Two) Times a Day As Needed for Anxiety. 1/2 to 1 in am and 1 pm prn anxiety., Disp: 60 tablet, Rfl: 2  •  PARoxetine HCl (PAXIL PO), Take 60 mg by mouth Daily., Disp: , Rfl:   •  sevelamer (RENVELA) 800 MG tablet, Take 800 mg by mouth 3 (Three) Times a Day With Meals., Disp: , Rfl:   •  timolol (TIMOPTIC) 0.25 % ophthalmic solution, 1 drop 2 (Two) Times a Day., Disp: , Rfl:   •  vitamin D (ERGOCALCIFEROL) 92025 UNITS capsule capsule, Take 50,000 Units by mouth 1 (One) Time Per Week., Disp: , Rfl:   •  warfarin (COUMADIN) 3 MG tablet, Use as directed by  prescriber., Disp: 30 tablet, Rfl: 3  •  warfarin (COUMADIN) 5 MG tablet, , Disp: , Rfl:   •  MEROPENEM IV, Infuse  into a venous catheter., Disp: , Rfl:

## 2017-07-17 RX ORDER — WARFARIN SODIUM 3 MG/1
TABLET ORAL
Qty: 30 TABLET | Refills: 6 | Status: SHIPPED | OUTPATIENT
Start: 2017-07-17 | End: 2018-02-22 | Stop reason: SDUPTHER

## 2017-08-14 ENCOUNTER — TELEPHONE (OUTPATIENT)
Dept: FAMILY MEDICINE CLINIC | Facility: CLINIC | Age: 77
End: 2017-08-14

## 2017-08-15 NOTE — TELEPHONE ENCOUNTER
Pt informed rx called to [facility] on answering machine and rx called #[phone] x 1 refill per dr luna/zechariah

## 2017-09-07 ENCOUNTER — OFFICE VISIT (OUTPATIENT)
Dept: FAMILY MEDICINE CLINIC | Facility: CLINIC | Age: 77
End: 2017-09-07

## 2017-09-07 VITALS
HEIGHT: 66 IN | OXYGEN SATURATION: 98 % | BODY MASS INDEX: 33.43 KG/M2 | TEMPERATURE: 98.4 F | SYSTOLIC BLOOD PRESSURE: 138 MMHG | HEART RATE: 66 BPM | WEIGHT: 208 LBS | DIASTOLIC BLOOD PRESSURE: 76 MMHG

## 2017-09-07 DIAGNOSIS — M79.661 PAIN OF RIGHT CALF: ICD-10-CM

## 2017-09-07 DIAGNOSIS — M25.461 SWELLING OF JOINT, KNEE, RIGHT: ICD-10-CM

## 2017-09-07 DIAGNOSIS — I48.0 PAROXYSMAL ATRIAL FIBRILLATION (HCC): Primary | ICD-10-CM

## 2017-09-07 LAB — INR PPP: 1.9 (ref 0.9–1.1)

## 2017-09-07 PROCEDURE — 85610 PROTHROMBIN TIME: CPT | Performed by: NURSE PRACTITIONER

## 2017-09-07 PROCEDURE — 36416 COLLJ CAPILLARY BLOOD SPEC: CPT | Performed by: NURSE PRACTITIONER

## 2017-09-07 PROCEDURE — 99213 OFFICE O/P EST LOW 20 MIN: CPT | Performed by: NURSE PRACTITIONER

## 2017-09-07 RX ORDER — LEVOTHYROXINE SODIUM 0.05 MG/1
50 TABLET ORAL DAILY
COMMUNITY
Start: 2017-08-15

## 2017-09-07 RX ORDER — HYDROCODONE BITARTRATE AND ACETAMINOPHEN 5; 325 MG/1; MG/1
1 TABLET ORAL EVERY 6 HOURS PRN
Qty: 12 TABLET | Refills: 0 | Status: SHIPPED | OUTPATIENT
Start: 2017-09-07 | End: 2017-12-07 | Stop reason: SDUPTHER

## 2017-09-07 NOTE — PROGRESS NOTES
"Subjective   Brooklyn Galvan is a 77 y.o. female who presents for follow up after a fall that occurred 8/23/17. Went to  on 8/24/17 and was transferred to [facility]. Was dx with 3 broken ribs and bruising of right leg. Leg is getting darker in color and starting to swell.     History of Present Illness   Reports ribs are sore, after tripping over a dog in the basement. No dizziness or LOC, did hit eye when fell. This swelling has resolved. No vision changes.     Knee swollen and turning black. Told no fracture just getting more swollen. Hurts behind knee and to top of knee    Would like a few more HC to manage the pain, do not make dizzy, do make tired, but allow rest.   The following portions of the patient's history were reviewed and updated as appropriate: allergies, current medications, past family history, past medical history, past social history, past surgical history and problem list.    Review of Systems   Constitutional: Negative for chills and fever.   HENT: Negative.    Eyes: Negative.    Respiratory: Negative for shortness of breath.         Is using incentive spirometer   Cardiovascular: Negative.    Endocrine: Negative.    Genitourinary: Negative.    Musculoskeletal: Positive for arthralgias and myalgias.   Allergic/Immunologic: Negative.    Neurological: Negative.    Hematological: Bruises/bleeds easily.   Psychiatric/Behavioral: Negative.      /76  Pulse 66  Temp 98.4 °F (36.9 °C) (Oral)   Ht 65.5\" (166.4 cm)  Wt 208 lb (94.3 kg)  SpO2 98%  BMI 34.09 kg/m2    Objective   Physical Exam   Constitutional: She appears well-developed and well-nourished.   Neck: Normal range of motion. Neck supple.   Cardiovascular: Normal rate, regular rhythm and normal heart sounds.    Calf mild tenderness, severe bruising and swelling most prominent suprapatellar, but extending into the calf.   Pulmonary/Chest: Effort normal and breath sounds normal.   Musculoskeletal:        Right knee: She exhibits " swelling, ecchymosis and erythema. She exhibits no effusion.        Right upper leg: She exhibits swelling and edema.        Right lower leg: She exhibits tenderness, swelling and edema.   Skin: Skin is warm and dry.   Psychiatric: She has a normal mood and affect. Her behavior is normal. Judgment and thought content normal.   Nursing note and vitals reviewed.    Assessment/Plan   Problems Addressed this Visit        Cardiovascular and Mediastinum    Paroxysmal atrial fibrillation - Primary    Relevant Orders    POCT INR (Completed)      Other Visit Diagnoses     Swelling of joint, knee, right        Relevant Orders    Duplex Venous Lower Extremity - Right CAR    Pain of right calf        Relevant Orders    Duplex Venous Lower Extremity - Right CAR        Results for orders placed or performed in visit on 09/07/17   POCT INR   Result Value Ref Range    INR 1.9 (A) 0.9 - 1.1     Reviewed rib xrays and R anterolateral 567 rib fractures possible. Encouraged not to bind up, but to take deep breaths hourly. Continue incentive spirometry    RLE edema and tenderness, coupled with immobility (wheelchair use), recommend doppler to Rule out DVT. Will fax INR to cardiology, Dr. Roberts.     SANJIV Report:      As part of this patient's treatment plan, I am prescribing controlled substances. The patient has been made aware of appropriate use of such medications, including potential risk of somnolence, limited ability to drive and /or work safely, and potential for dependence or overdose. It has also been made clear that these medications are for use by this patient only, without concomitant use of alcohol or other substances unless prescribed.    SANJIV report has been reviewed by: ELIEL Damon on 09/07/17.  The report was scanned into the patient's chart.    Date of last SANJIV:  9/7/2017    History and physical exam exhibit continued safe and appropriate use of controlled substances. Likely no more than 3 more days  of medication necessary, secondary to taut bruising to dorsum of knee.

## 2017-09-15 ENCOUNTER — TELEPHONE (OUTPATIENT)
Dept: FAMILY MEDICINE CLINIC | Facility: CLINIC | Age: 77
End: 2017-09-15

## 2017-12-01 ENCOUNTER — OFFICE VISIT (OUTPATIENT)
Dept: FAMILY MEDICINE CLINIC | Facility: CLINIC | Age: 77
End: 2017-12-01

## 2017-12-01 ENCOUNTER — TELEPHONE (OUTPATIENT)
Dept: FAMILY MEDICINE CLINIC | Facility: CLINIC | Age: 77
End: 2017-12-01

## 2017-12-01 VITALS
SYSTOLIC BLOOD PRESSURE: 134 MMHG | BODY MASS INDEX: 34.23 KG/M2 | DIASTOLIC BLOOD PRESSURE: 62 MMHG | WEIGHT: 213 LBS | HEART RATE: 73 BPM | OXYGEN SATURATION: 91 % | TEMPERATURE: 97.8 F | HEIGHT: 66 IN

## 2017-12-01 DIAGNOSIS — M62.838 MUSCLE SPASM: Primary | ICD-10-CM

## 2017-12-01 PROCEDURE — 99213 OFFICE O/P EST LOW 20 MIN: CPT | Performed by: NURSE PRACTITIONER

## 2017-12-01 RX ORDER — BACLOFEN 10 MG/1
10 TABLET ORAL 3 TIMES DAILY
Qty: 60 TABLET | Refills: 0 | Status: SHIPPED | OUTPATIENT
Start: 2017-12-01 | End: 2017-12-07 | Stop reason: SINTOL

## 2017-12-01 NOTE — PROGRESS NOTES
"Subjective   Brooklyn Galvan is a 77 y.o. female. Pain in her R hip. Started about one week ago. Every time she moves she has a sharp pain in the right hip area. No radiation. No numbness or tingling in the leg. She had a similar problem once before but has been a long time but doesn't really remember what they did for it. She is a dialysis patient and goes 3x/week. She can't take a lot of medications because of her kidneys. No NSAID's.     Hip Pain    The incident occurred more than 1 week ago. The incident occurred at home. There was no injury mechanism. The pain is present in the right hip. The pain is at a severity of 9/10. The pain has been constant since onset. Pertinent negatives include no muscle weakness or numbness. Treatments tried: pain pill. The treatment provided moderate relief.        The following portions of the patient's history were reviewed and updated as appropriate: allergies, current medications, past medical history, past social history, past surgical history and problem list.    Review of Systems   Constitutional: Negative.    Respiratory: Negative.    Cardiovascular: Negative.    Musculoskeletal: Positive for arthralgias (right hip) and back pain.   Neurological: Negative for numbness.       Objective   Physical Exam   Constitutional: Vital signs are normal. She appears well-developed and well-nourished. She is cooperative.   Cardiovascular: Normal rate and regular rhythm.    Pulmonary/Chest: Effort normal and breath sounds normal.   Musculoskeletal:        Right hip: She exhibits tenderness.        Legs:  Spasm in right piriformis region   Neurological: She is alert.   Nursing note and vitals reviewed.    Vitals:    12/01/17 1117   BP: 134/62   Pulse: 73   Temp: 97.8 °F (36.6 °C)   TempSrc: Oral   SpO2: 91%   Weight: 213 lb (96.6 kg)   Height: 65.5\" (166.4 cm)       Assessment/Plan   Diagnoses and all orders for this visit:    Muscle spasm  -     baclofen (LIORESAL) 10 MG tablet; Take " 1 tablet by mouth 3 (Three) Times a Day.      Use heating pad to area  Gentle stretching exercises.  No NSAID's due to ESRD  Follow up if not improved

## 2017-12-07 ENCOUNTER — OFFICE VISIT (OUTPATIENT)
Dept: FAMILY MEDICINE CLINIC | Facility: CLINIC | Age: 77
End: 2017-12-07

## 2017-12-07 VITALS
WEIGHT: 210 LBS | DIASTOLIC BLOOD PRESSURE: 68 MMHG | TEMPERATURE: 98.3 F | SYSTOLIC BLOOD PRESSURE: 134 MMHG | OXYGEN SATURATION: 100 % | BODY MASS INDEX: 33.75 KG/M2 | HEART RATE: 62 BPM | HEIGHT: 66 IN

## 2017-12-07 DIAGNOSIS — Z99.2 END-STAGE RENAL DISEASE ON HEMODIALYSIS (HCC): ICD-10-CM

## 2017-12-07 DIAGNOSIS — Z91.81 AT HIGH RISK FOR FALLS: ICD-10-CM

## 2017-12-07 DIAGNOSIS — N18.6 END-STAGE RENAL DISEASE ON HEMODIALYSIS (HCC): ICD-10-CM

## 2017-12-07 DIAGNOSIS — T21.25XA PARTIAL THICKNESS BURN OF BUTTOCK, INITIAL ENCOUNTER: ICD-10-CM
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
## 2017-12-07 DIAGNOSIS — F41.9 ANXIETY: ICD-10-CM

## 2017-12-07 DIAGNOSIS — M54.50 ACUTE RIGHT-SIDED LOW BACK PAIN WITHOUT SCIATICA: ICD-10-CM

## 2017-12-07 DIAGNOSIS — T14.8XXA NONHEALING NONSURGICAL WOUND: ICD-10-CM

## 2017-12-07 DIAGNOSIS — L03.317 CELLULITIS OF BUTTOCK: ICD-10-CM

## 2017-12-07 DIAGNOSIS — M79.18 RIGHT BUTTOCK PAIN: Primary | ICD-10-CM

## 2017-12-07 PROCEDURE — 99214 OFFICE O/P EST MOD 30 MIN: CPT | Performed by: FAMILY MEDICINE

## 2017-12-07 RX ORDER — HYDROCODONE BITARTRATE AND ACETAMINOPHEN 5; 325 MG/1; MG/1
1 TABLET ORAL EVERY 6 HOURS PRN
Qty: 12 TABLET | Refills: 0 | Status: SHIPPED | OUTPATIENT
Start: 2017-12-07 | End: 2018-07-05

## 2017-12-07 RX ORDER — LORAZEPAM 1 MG/1
1 TABLET ORAL 2 TIMES DAILY PRN
Qty: 60 TABLET | Refills: 2 | Status: SHIPPED | OUTPATIENT
Start: 2017-12-07 | End: 2019-02-05 | Stop reason: SDUPTHER

## 2017-12-07 RX ORDER — PAROXETINE HYDROCHLORIDE 40 MG/1
TABLET, FILM COATED ORAL
Qty: 45 TABLET | Refills: 12 | Status: SHIPPED | OUTPATIENT
Start: 2017-12-07 | End: 2018-12-13 | Stop reason: SDUPTHER

## 2017-12-07 RX ORDER — DOXYCYCLINE HYCLATE 100 MG/1
100 CAPSULE ORAL 2 TIMES DAILY
Qty: 20 CAPSULE | Refills: 0 | Status: SHIPPED | OUTPATIENT
Start: 2017-12-07 | End: 2018-02-16

## 2017-12-07 NOTE — PROGRESS NOTES
Subjective   Brooklyn Galvan is a 77 y.o. female. Presents today for   Chief Complaint   Patient presents with   • Hip Pain     pt's right hip is hurting.       Hip Pain    Incident onset: 2.5 months ago fell, went to hospital;  Had broken ribs which have healed, but right hip still hurts. The incident occurred at home. The injury mechanism was a fall. The pain is present in the right hip. The quality of the pain is described as aching. The pain is moderate. The pain has been constant since onset. Pertinent negatives include no numbness or tingling. The symptoms are aggravated by weight bearing and movement. Treatments tried: Given MR, but made dizzy after 1 dose so stopped;   sitting still helps a little;   PIllow, heat with miniam relief.   Wound Infection   This is a new problem. Episode onset: past couple weeks, fell asleep on heating pad. The problem occurs constantly. The problem has been unchanged. Associated symptoms include a rash. Pertinent negatives include no chest pain, chills, fever or numbness. Exacerbated by: ? burn from heating pad. Treatments tried: neosporin. The treatment provided no relief.     Reports will need ativan at some point, just had last refill.  Hx of anxiety, reports psychiatry moved practices too far to travel to and would like paxil refilled here.  Hx of ESRD, on HD.  Review of Systems   Constitutional: Negative for chills and fever.   Respiratory: Negative for shortness of breath.    Cardiovascular: Negative for chest pain and palpitations.   Musculoskeletal: Positive for back pain and gait problem.   Skin: Positive for rash and wound.   Neurological: Negative for tingling, syncope and numbness.       The following portions of the patient's history were reviewed and updated as appropriate: allergies, current medications, past medical history and problem list.    Patient Active Problem List   Diagnosis   • Vitamin D deficiency   • Anemia of chronic renal failure   • Benign  essential hypertension   • Chronic kidney disease, stage V   • Constipation   • Type 2 diabetes mellitus with chronic kidney disease on chronic dialysis, without long-term current use of insulin   • Dyslipidemia   • Generalized anxiety disorder   • Low back pain   • Post-menopausal osteoporosis   • End-stage renal disease on hemodialysis   • Paroxysmal atrial fibrillation   • Coronary artery disease involving native coronary artery   • Chronic anticoagulation   • Non-rheumatic mitral regurgitation   • Pulmonary hypertension   • ANDREA (obstructive sleep apnea)   • Nonischemic cardiomyopathy   • Anxiety   • Adiposity   • 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     • atorvastatin (LIPITOR) 10 MG tablet Take 10 mg by mouth daily. ONE QD FOR CHOLESTEROL & HEART HEALTH     • b complex-C-folic acid 1 MG capsule Take 1 capsule by mouth daily.     • bimatoprost (LUMIGAN) 0.01 % ophthalmic drops Apply 1 drop to eye daily.     • DOCQLACE 100 MG capsule TAKE ONE CAPSULE BY MOUTH TWICE A DAY 60 capsule 10   • levothyroxine (SYNTHROID, LEVOTHROID) 50 MCG tablet Take 50 mcg by mouth Daily.     • sevelamer (RENVELA) 800 MG tablet Take 800 mg by mouth 3 (Three) Times a Day With Meals.     • timolol (TIMOPTIC) 0.25 % ophthalmic solution 1 drop 2 (Two) Times a Day.     • vitamin D (ERGOCALCIFEROL) 28707 UNITS capsule capsule Take 50,000 Units by mouth 1 (One) Time Per Week.     • warfarin (COUMADIN) 3 MG tablet TAKE ONE TABLET BY MOUTH DAILY AS DIRECTED BY PRESCRIBER 30 tablet 6   • warfarin (COUMADIN) 5 MG tablet        No current facility-administered medications on file  "prior to visit.        Objective   Vitals:    12/07/17 1023   BP: 134/68   BP Location: Left arm   Patient Position: Sitting   Cuff Size: Large Adult   Pulse: 62   Temp: 98.3 °F (36.8 °C)   TempSrc: Oral   SpO2: 100%   Weight: 95.3 kg (210 lb)   Height: 166.4 cm (65.5\")       Physical Exam   Constitutional: She is oriented to person, place, and time. She appears well-developed and well-nourished.   HENT:   Head: Normocephalic and atraumatic.   Neck: Neck supple. No JVD present. No thyromegaly present.   Cardiovascular: Normal rate, regular rhythm and normal heart sounds.  Exam reveals no gallop and no friction rub.    No murmur heard.  Pulmonary/Chest: Effort normal and breath sounds normal. No respiratory distress. She has no wheezes. She has no rales.   Abdominal: Soft. Bowel sounds are normal. She exhibits no distension. There is no tenderness. There is no rebound and no guarding.   Musculoskeletal: She exhibits no edema.        Lumbar back: She exhibits tenderness and spasm. She exhibits no bony tenderness and no swelling.   TTP over right piriformis   Neurological: She is alert and oriented to person, place, and time. She exhibits abnormal muscle tone. Gait abnormal.   Ambulates with walker   Skin: Skin is warm and dry.   Right upper buttock 5 x 2 cm skin breakdown ? Burn with surrounding erythema and warmth;  No fluctuance or abscess.   Psychiatric: She has a normal mood and affect. Her behavior is normal.   Nursing note and vitals reviewed.      Assessment/Plan   Brooklyn was seen today for hip pain.    Diagnoses and all orders for this visit:    Right buttock pain  -     Ambulatory Referral to Home Health    Acute right-sided low back pain without sciatica  -     HYDROcodone-acetaminophen (NORCO) 5-325 MG per tablet; Take 1 tablet by mouth Every 6 (Six) Hours As Needed for Severe Pain .  -     Ambulatory Referral to Home Health    Nonhealing nonsurgical wound  Comments:  Possibly 2ndary to heating pad " burn  Orders:  -     doxycycline (VIBRAMYCIN) 100 MG capsule; Take 1 capsule by mouth 2 (Two) Times a Day.  -     mupirocin (BACTROBAN) 2 % ointment; Apply  topically 2 (Two) Times a Day. To right buttock  -     Ambulatory Referral to Home Health    Cellulitis of buttock  -     doxycycline (VIBRAMYCIN) 100 MG capsule; Take 1 capsule by mouth 2 (Two) Times a Day.  -     mupirocin (BACTROBAN) 2 % ointment; Apply  topically 2 (Two) Times a Day. To right buttock  -     Ambulatory Referral to Home Health    Anxiety  -     LORazepam (ATIVAN) 1 MG tablet; Take 1 tablet by mouth 2 (Two) Times a Day As Needed for Anxiety. 1/2 to 1 in am and 1 pm prn anxiety.  -     PARoxetine (PAXIL) 40 MG tablet; 1.5 tablets daily    End-stage renal disease on hemodialysis    Fall, sequela  -     Ambulatory Referral to Home Health    At high risk for falls  -     Ambulatory Referral to Home Health    Partial thickness burn of buttock, initial encounter  Comments:  <1%, possibly 2ndary to heating pad.    Stop heating pad  Start abx, wound care;  HHC to check on wound  Physical therapy for pain;  HC prn, caution with use  Refer HHC:  Reason/Clinical Findings Patient right low back pain, fall;  Also infected wound from burn;  Needs PT for gait training reduce falls and wound care/checks    Describe mobility limitations that make leaving home difficult Patient unable to drive;  Ambulates slowly with walker.    Nursing/Therapeutic Services Requested Skilled Nursing     Physical Therapy    Skilled nursing orders: Pain management     Wound care dressing/changes    PT orders: Therapeutic exercise     Gait Training     Strengthening    Weight Bearing Status As Tolerated    Frequency: 1 Week 1             -Follow up: 3 months       Current Outpatient Prescriptions:   •  amiodarone (PACERONE) 200 MG tablet, Take 100 mg by mouth Daily., Disp: , Rfl:   •  atorvastatin (LIPITOR) 10 MG tablet, Take 10 mg by mouth daily. ONE QD FOR CHOLESTEROL & HEART  HEALTH, Disp: , Rfl:   •  b complex-C-folic acid 1 MG capsule, Take 1 capsule by mouth daily., Disp: , Rfl:   •  bimatoprost (LUMIGAN) 0.01 % ophthalmic drops, Apply 1 drop to eye daily., Disp: , Rfl:   •  DOCQLACE 100 MG capsule, TAKE ONE CAPSULE BY MOUTH TWICE A DAY, Disp: 60 capsule, Rfl: 10  •  HYDROcodone-acetaminophen (NORCO) 5-325 MG per tablet, Take 1 tablet by mouth Every 6 (Six) Hours As Needed for Severe Pain ., Disp: 12 tablet, Rfl: 0  •  levothyroxine (SYNTHROID, LEVOTHROID) 50 MCG tablet, Take 50 mcg by mouth Daily., Disp: , Rfl:   •  LORazepam (ATIVAN) 1 MG tablet, Take 1 tablet by mouth 2 (Two) Times a Day As Needed for Anxiety. 1/2 to 1 in am and 1 pm prn anxiety., Disp: 60 tablet, Rfl: 2  •  PARoxetine (PAXIL) 40 MG tablet, 1.5 tablets daily, Disp: 45 tablet, Rfl: 12  •  sevelamer (RENVELA) 800 MG tablet, Take 800 mg by mouth 3 (Three) Times a Day With Meals., Disp: , Rfl:   •  timolol (TIMOPTIC) 0.25 % ophthalmic solution, 1 drop 2 (Two) Times a Day., Disp: , Rfl:   •  vitamin D (ERGOCALCIFEROL) 09035 UNITS capsule capsule, Take 50,000 Units by mouth 1 (One) Time Per Week., Disp: , Rfl:   •  warfarin (COUMADIN) 3 MG tablet, TAKE ONE TABLET BY MOUTH DAILY AS DIRECTED BY PRESCRIBER, Disp: 30 tablet, Rfl: 6  •  warfarin (COUMADIN) 5 MG tablet, , Disp: , Rfl:   •  doxycycline (VIBRAMYCIN) 100 MG capsule, Take 1 capsule by mouth 2 (Two) Times a Day., Disp: 20 capsule, Rfl: 0  •  mupirocin (BACTROBAN) 2 % ointment, Apply  topically 2 (Two) Times a Day. To right buttock, Disp: 30 g, Rfl: 0

## 2018-01-02 ENCOUNTER — TELEPHONE (OUTPATIENT)
Dept: FAMILY MEDICINE CLINIC | Facility: CLINIC | Age: 78
End: 2018-01-02

## 2018-01-02 NOTE — TELEPHONE ENCOUNTER
After goes to wound care center, does she already have an appt?  Does [facility] have wound care nurse can evaluate and treat?

## 2018-01-03 NOTE — TELEPHONE ENCOUNTER
Have [facility] Nurse go and check on wound.  Apply bacitracin daily and cover sterile gauze.    RRJ

## 2018-01-03 NOTE — TELEPHONE ENCOUNTER
Per pts daughter, pt has appt with [facility] and is seeing Dr. Alexander on Tues, 1/9/18 at 1:45PM.  I spoke with Oscar from [facility] and she said right now they do not have a wound care nurse to evaluate and treat pt and she is not sure how much longer the lady that is training has before she will be able to do this. She said they do have a nurse that can go out and watch the pt on the burn. Do want to have [facility] do this or do you want to send [facility] out for wound care nurse to evaluate and treat?  Please advise.

## 2018-02-16 ENCOUNTER — OFFICE VISIT (OUTPATIENT)
Dept: FAMILY MEDICINE CLINIC | Facility: CLINIC | Age: 78
End: 2018-02-16

## 2018-02-16 VITALS
DIASTOLIC BLOOD PRESSURE: 72 MMHG | HEART RATE: 70 BPM | WEIGHT: 209 LBS | SYSTOLIC BLOOD PRESSURE: 134 MMHG | HEIGHT: 66 IN | BODY MASS INDEX: 33.59 KG/M2 | TEMPERATURE: 98 F | OXYGEN SATURATION: 95 %

## 2018-02-16 DIAGNOSIS — J40 BRONCHITIS: Primary | ICD-10-CM

## 2018-02-16 DIAGNOSIS — R05.9 COUGH: ICD-10-CM

## 2018-02-16 PROCEDURE — 99213 OFFICE O/P EST LOW 20 MIN: CPT | Performed by: NURSE PRACTITIONER

## 2018-02-16 RX ORDER — DOXYCYCLINE HYCLATE 100 MG
100 TABLET ORAL 2 TIMES DAILY
Qty: 20 TABLET | Refills: 0 | Status: SHIPPED | OUTPATIENT
Start: 2018-02-16 | End: 2018-05-01

## 2018-02-16 RX ORDER — GUAIFENESIN 600 MG/1
1200 TABLET, EXTENDED RELEASE ORAL 2 TIMES DAILY
Qty: 60 TABLET | Refills: 0 | Status: SHIPPED | OUTPATIENT
Start: 2018-02-16 | End: 2018-05-01

## 2018-02-16 RX ORDER — BENZONATATE 100 MG/1
100 CAPSULE ORAL 3 TIMES DAILY PRN
Qty: 30 CAPSULE | Refills: 0 | Status: SHIPPED | OUTPATIENT
Start: 2018-02-16 | End: 2018-05-01

## 2018-02-16 NOTE — PROGRESS NOTES
"Subjective   Brooklyn Galvan is a 77 y.o. female. Cough, congestion. This started about 4 weeks ago with sore throat, then cough and congestion. The kidney dr gave her a z pack, she seemed a little better but not quite over it. She asked him for another one and he gave her another round of the zpack. She still is coughing and not feeling better. Cough is was productive but now is not as much. She was using her 's albuterol inhaler at home.     Cough   This is a new problem. The current episode started 1 to 4 weeks ago. The problem has been waxing and waning. The cough is non-productive. Associated symptoms include chills, nasal congestion, shortness of breath and wheezing. Pertinent negatives include no fever or sore throat. The symptoms are aggravated by lying down.        The following portions of the patient's history were reviewed and updated as appropriate: allergies, current medications, past medical history, past social history, past surgical history and problem list.    Review of Systems   Constitutional: Positive for chills. Negative for fever.   HENT: Positive for congestion. Negative for sore throat.    Respiratory: Positive for cough, chest tightness, shortness of breath and wheezing.    Cardiovascular: Negative.        Objective   Physical Exam   Constitutional: Vital signs are normal. She appears well-developed and well-nourished. She is cooperative.   HENT:   Mouth/Throat: Uvula is midline and mucous membranes are normal. Posterior oropharyngeal erythema present.   Cardiovascular: Normal rate and regular rhythm.    Pulmonary/Chest: Effort normal and breath sounds normal. No respiratory distress. She has no wheezes.   Neurological: She is alert.   Nursing note and vitals reviewed.    Vitals:    02/16/18 1522   BP: 134/72   Pulse: 70   Temp: 98 °F (36.7 °C)   TempSrc: Oral   SpO2: 95%   Weight: 94.8 kg (209 lb)   Height: 166.4 cm (65.51\")       Assessment/Plan   Diagnoses and all orders for " this visit:    Bronchitis  -     doxycycline (VIBRAMYICN) 100 MG tablet; Take 1 tablet by mouth 2 (Two) Times a Day.    Cough  -     benzonatate (TESSALON PERLES) 100 MG capsule; Take 1 capsule by mouth 3 (Three) Times a Day As Needed for Cough.  -     doxycycline (VIBRAMYICN) 100 MG tablet; Take 1 tablet by mouth 2 (Two) Times a Day.  -     guaiFENesin (MUCINEX) 600 MG 12 hr tablet; Take 2 tablets by mouth 2 (Two) Times a Day.    Increase fluids some.   Have protime checked at dialysis on Monday and Thursday next week while taking antibiotics  RTC if not improving.

## 2018-02-22 RX ORDER — WARFARIN SODIUM 3 MG/1
TABLET ORAL
Qty: 30 TABLET | Refills: 1 | Status: SHIPPED | OUTPATIENT
Start: 2018-02-22 | End: 2018-04-24 | Stop reason: SDUPTHER

## 2018-04-24 RX ORDER — WARFARIN SODIUM 3 MG/1
TABLET ORAL
Qty: 30 TABLET | Refills: 1 | Status: SHIPPED | OUTPATIENT
Start: 2018-04-24 | End: 2018-07-05 | Stop reason: SDUPTHER

## 2018-05-01 ENCOUNTER — OFFICE VISIT (OUTPATIENT)
Dept: FAMILY MEDICINE CLINIC | Facility: CLINIC | Age: 78
End: 2018-05-01

## 2018-05-01 VITALS
SYSTOLIC BLOOD PRESSURE: 124 MMHG | HEIGHT: 66 IN | WEIGHT: 217 LBS | BODY MASS INDEX: 34.87 KG/M2 | DIASTOLIC BLOOD PRESSURE: 68 MMHG | OXYGEN SATURATION: 95 % | HEART RATE: 65 BPM | TEMPERATURE: 98.2 F

## 2018-05-01 DIAGNOSIS — R68.83 CHILLS: Primary | ICD-10-CM

## 2018-05-01 DIAGNOSIS — N30.01 ACUTE CYSTITIS WITH HEMATURIA: ICD-10-CM

## 2018-05-01 DIAGNOSIS — Z23 IMMUNIZATION DUE: ICD-10-CM

## 2018-05-01 LAB
BILIRUB BLD-MCNC: NEGATIVE MG/DL
CLARITY, POC: CLEAR
COLOR UR: YELLOW
GLUCOSE UR STRIP-MCNC: NEGATIVE MG/DL
KETONES UR QL: NEGATIVE
LEUKOCYTE EST, POC: ABNORMAL
NITRITE UR-MCNC: NEGATIVE MG/ML
PH UR: 8 [PH] (ref 5–8)
PROT UR STRIP-MCNC: ABNORMAL MG/DL
RBC # UR STRIP: ABNORMAL /UL
SP GR UR: 1.02 (ref 1–1.03)
UROBILINOGEN UR QL: NORMAL

## 2018-05-01 PROCEDURE — 90632 HEPA VACCINE ADULT IM: CPT | Performed by: NURSE PRACTITIONER

## 2018-05-01 PROCEDURE — 99213 OFFICE O/P EST LOW 20 MIN: CPT | Performed by: NURSE PRACTITIONER

## 2018-05-01 PROCEDURE — 81003 URINALYSIS AUTO W/O SCOPE: CPT | Performed by: NURSE PRACTITIONER

## 2018-05-01 PROCEDURE — 90471 IMMUNIZATION ADMIN: CPT | Performed by: NURSE PRACTITIONER

## 2018-05-01 RX ORDER — CEPHALEXIN 250 MG/1
250 CAPSULE ORAL 2 TIMES DAILY
Qty: 10 CAPSULE | Refills: 0 | Status: SHIPPED | OUTPATIENT
Start: 2018-05-01 | End: 2018-07-05

## 2018-05-01 NOTE — PROGRESS NOTES
"Subjective   Brooklyn Galvan is a 78 y.o. female who presents c/o chills, upset stomach x 2 weeks. Wants to rule out kidney infection.     History of Present Illness   Reports symptoms of UTI including pain with urination and sense of bladder fullness, though does go to dialysis. Has been chilling for 2 weeks. Has been sick to stomach. Had some diarrhea last week.   The following portions of the patient's history were reviewed and updated as appropriate: allergies, current medications, past family history, past medical history, past social history, past surgical history and problem list.    Review of Systems   Constitutional: Positive for chills. Negative for fever.   Genitourinary: Positive for decreased urine volume, difficulty urinating and frequency. Negative for dysuria and hematuria.   Neurological: Negative.    Psychiatric/Behavioral: Negative.      /68   Pulse 65   Temp 98.2 °F (36.8 °C) (Oral)   Ht 166.4 cm (65.5\")   Wt 98.4 kg (217 lb)   SpO2 95%   BMI 35.56 kg/m²     Objective   Physical Exam   Constitutional: She appears well-developed and well-nourished.   Abdominal: Soft. Bowel sounds are normal. She exhibits no distension. There is no tenderness.   Psychiatric: She has a normal mood and affect. Her behavior is normal. Judgment and thought content normal.   Nursing note and vitals reviewed.    Assessment/Plan   Problems Addressed this Visit     None      Visit Diagnoses     Chills    -  Primary    Relevant Orders    POCT urinalysis dipstick, automated (Completed)    Acute cystitis with hematuria        Immunization due        Relevant Orders    Hepatitis A Vaccine Adult IM        Results for orders placed or performed in visit on 05/01/18   POCT urinalysis dipstick, automated   Result Value Ref Range    Color Yellow Yellow, Straw, Dark Yellow, Madie    Clarity, UA Clear Clear    Glucose, UA Negative Negative, 1000 mg/dL (3+) mg/dL    Bilirubin Negative Negative    Ketones, UA Negative " Negative    Specific Gravity  1.025 1.005 - 1.030    Blood, UA Moderate (A) Negative    pH, Urine 8.0 5.0 - 8.0    Protein,  mg/dL (A) Negative mg/dL    Urobilinogen, UA Normal Normal    Leukocytes Small (1+) (A) Negative    Nitrite, UA Negative Negative     Recommend vaccination as local Hep A outbreak, has been vaccinated for Hep B.   Will treat for UTI, based on symptoms. To dose antibiotics after dialysis. Daughter reports previous treatment with cephalexin without adverse reaction. Will return with urine for culture, small volume, insufficient specimen today.

## 2018-06-22 ENCOUNTER — TELEPHONE (OUTPATIENT)
Dept: FAMILY MEDICINE CLINIC | Facility: CLINIC | Age: 78
End: 2018-06-22

## 2018-06-22 NOTE — TELEPHONE ENCOUNTER
Tried to call patient and no numbers working.  Called [facility] in [phone] refills she needs ov and has a pending ov 7-5-18 jm

## 2018-07-05 ENCOUNTER — OFFICE VISIT (OUTPATIENT)
Dept: FAMILY MEDICINE CLINIC | Facility: CLINIC | Age: 78
End: 2018-07-05

## 2018-07-05 VITALS
WEIGHT: 213 LBS | BODY MASS INDEX: 34.23 KG/M2 | SYSTOLIC BLOOD PRESSURE: 140 MMHG | DIASTOLIC BLOOD PRESSURE: 62 MMHG | HEART RATE: 72 BPM | OXYGEN SATURATION: 96 % | HEIGHT: 66 IN | TEMPERATURE: 97.9 F

## 2018-07-05 DIAGNOSIS — E11.22 TYPE 2 DIABETES MELLITUS WITH CHRONIC KIDNEY DISEASE ON CHRONIC DIALYSIS, WITHOUT LONG-TERM CURRENT USE OF INSULIN (HCC): ICD-10-CM

## 2018-07-05 DIAGNOSIS — I48.0 PAROXYSMAL ATRIAL FIBRILLATION (HCC): ICD-10-CM

## 2018-07-05 DIAGNOSIS — Z99.2 TYPE 2 DIABETES MELLITUS WITH CHRONIC KIDNEY DISEASE ON CHRONIC DIALYSIS, WITHOUT LONG-TERM CURRENT USE OF INSULIN (HCC): ICD-10-CM

## 2018-07-05 DIAGNOSIS — F41.9 ANXIETY: Primary | ICD-10-CM

## 2018-07-05 DIAGNOSIS — N18.6 TYPE 2 DIABETES MELLITUS WITH CHRONIC KIDNEY DISEASE ON CHRONIC DIALYSIS, WITHOUT LONG-TERM CURRENT USE OF INSULIN (HCC): ICD-10-CM

## 2018-07-05 DIAGNOSIS — Z12.39 BREAST CANCER SCREENING: ICD-10-CM

## 2018-07-05 DIAGNOSIS — I42.8 NONISCHEMIC CARDIOMYOPATHY (HCC): ICD-10-CM

## 2018-07-05 DIAGNOSIS — Z99.2 END-STAGE RENAL DISEASE ON HEMODIALYSIS (HCC): ICD-10-CM

## 2018-07-05 DIAGNOSIS — N18.6 END-STAGE RENAL DISEASE ON HEMODIALYSIS (HCC): ICD-10-CM

## 2018-07-05 DIAGNOSIS — M86.9 OSTEOMYELITIS OF METATARSAL (HCC): ICD-10-CM

## 2018-07-05 PROCEDURE — 99213 OFFICE O/P EST LOW 20 MIN: CPT | Performed by: FAMILY MEDICINE

## 2018-07-05 RX ORDER — SODIUM BICARBONATE 650 MG/1
650 TABLET ORAL 4 TIMES DAILY
COMMUNITY

## 2018-07-05 RX ORDER — DOCUSATE SODIUM 100 MG
CAPSULE ORAL
Qty: 60 CAPSULE | Refills: 9 | Status: SHIPPED | OUTPATIENT
Start: 2018-07-05 | End: 2019-07-27 | Stop reason: SDUPTHER

## 2018-07-05 RX ORDER — WARFARIN SODIUM 3 MG/1
TABLET ORAL
Qty: 30 TABLET | Refills: 5 | Status: SHIPPED | OUTPATIENT
Start: 2018-07-05 | End: 2019-01-15 | Stop reason: SDUPTHER

## 2018-07-05 NOTE — PROGRESS NOTES
Subjective   Brooklyn Galvan is a 78 y.o. female. Presents today for   Chief Complaint   Patient presents with   • Anxiety     pt here for med review and labs. she needs order for mammogram       History of Present Illness   Ms. Galvan is a 77 y/o female with Type 2 diabtes, ESRD, atrial fib, non-ischemic cardiomyopathy on hemodialysis 3 times a week.  Sees Endocrinology for diabetes management and here for 6 month recheck on anxiety and BZD (ativan).  Patient anxiety stable;  Doing ok overall.   Anxiety controlled, helps with leg cramps as well.  Patient aware of risks but has been on long-time and given on ESRD have continued.  Denies cp/soa;  No heart racing;  No falls;  No over sedation.    Review of Systems   Respiratory: Negative for shortness of breath.    Cardiovascular: Negative for chest pain.   Psychiatric/Behavioral: The patient is nervous/anxious.        Patient Active Problem List   Diagnosis   • Vitamin D deficiency   • Anemia of chronic renal failure   • Benign essential hypertension   • Chronic kidney disease, stage V (CMS/HCC)   • Constipation   • Type 2 diabetes mellitus with chronic kidney disease on chronic dialysis, without long-term current use of insulin (CMS/HCC)   • Dyslipidemia   • Generalized anxiety disorder   • Low back pain   • Post-menopausal osteoporosis   • End-stage renal disease on hemodialysis (CMS/HCC)   • Paroxysmal atrial fibrillation (CMS/HCC)   • Coronary artery disease involving native coronary artery   • Chronic anticoagulation   • Non-rheumatic mitral regurgitation   • Pulmonary hypertension   • ANDREA (obstructive sleep apnea)   • Nonischemic cardiomyopathy (CMS/HCC)   • Anxiety   • Adiposity   • Osteomyelitis of metatarsal (CMS/HCC)       Social History     Social History   • Marital status:      Social History Main Topics   • Smoking status: Former Smoker   • Smokeless tobacco: Never Used   • Alcohol use Defer      Comment: denied   • Drug use: Unknown     Other  Topics Concern   • Not on file       Allergies   Allergen Reactions   • Iodinated Diagnostic Agents Hives   • Penicillins Swelling   • Acetaminophen-Codeine    • Sulfa Antibiotics    • Acetaminophen Anxiety   • Promethazine Anxiety       Current Outpatient Prescriptions on File Prior to Visit   Medication Sig Dispense Refill   • amiodarone (PACERONE) 200 MG tablet Take 100 mg by mouth Daily.     • atorvastatin (LIPITOR) 10 MG tablet Take 10 mg by mouth daily. ONE QD FOR CHOLESTEROL & HEART HEALTH     • b complex-C-folic acid 1 MG capsule Take 1 capsule by mouth daily.     • bimatoprost (LUMIGAN) 0.01 % ophthalmic drops Apply 1 drop to eye daily.     • DOCQLACE 100 MG capsule TAKE ONE CAPSULE BY MOUTH TWICE A DAY 60 capsule 10   • levothyroxine (SYNTHROID, LEVOTHROID) 50 MCG tablet Take 50 mcg by mouth Daily.     • LORazepam (ATIVAN) 1 MG tablet Take 1 tablet by mouth 2 (Two) Times a Day As Needed for Anxiety. 1/2 to 1 in am and 1 pm prn anxiety. 60 tablet 2   • PARoxetine (PAXIL) 40 MG tablet 1.5 tablets daily 45 tablet 12   • sevelamer (RENVELA) 800 MG tablet Take 800 mg by mouth 3 (Three) Times a Day With Meals.     • timolol (TIMOPTIC) 0.25 % ophthalmic solution 1 drop 2 (Two) Times a Day.     • vitamin D (ERGOCALCIFEROL) 58015 UNITS capsule capsule Take 50,000 Units by mouth 1 (One) Time Per Week.     • warfarin (COUMADIN) 3 MG tablet TAKE ONE TABLET BY MOUTH DAILY AS DIRECTED BY PRESCRIBER 30 tablet 1   • warfarin (COUMADIN) 5 MG tablet      • [DISCONTINUED] cephalexin (KEFLEX) 250 MG capsule Take 1 capsule by mouth 2 (Two) Times a Day. 10 capsule 0   • [DISCONTINUED] HYDROcodone-acetaminophen (NORCO) 5-325 MG per tablet Take 1 tablet by mouth Every 6 (Six) Hours As Needed for Severe Pain . 12 tablet 0     No current facility-administered medications on file prior to visit.        Objective   Vitals:    07/05/18 1239   BP: 140/62   BP Location: Left arm   Patient Position: Sitting   Cuff Size: Large Adult  "  Pulse: 72   Temp: 97.9 °F (36.6 °C)   TempSrc: Oral   SpO2: 96%   Weight: 96.6 kg (213 lb)   Height: 166.4 cm (65.51\")       Physical Exam   Constitutional: She appears well-developed and well-nourished.   HENT:   Head: Normocephalic and atraumatic.   Neck: Neck supple. No JVD present. No thyromegaly present.   Cardiovascular: Normal rate, regular rhythm and normal heart sounds.  Exam reveals no gallop and no friction rub.    No murmur heard.  Pulmonary/Chest: Effort normal and breath sounds normal. No respiratory distress. She has no wheezes. She has no rales.   Abdominal: Soft. Bowel sounds are normal. She exhibits no distension. There is no tenderness. There is no rebound and no guarding.   Musculoskeletal: She exhibits no edema.   Neurological: She is alert.   Skin: Skin is warm and dry.   Psychiatric: She has a normal mood and affect. Her behavior is normal.   Nursing note and vitals reviewed.      Assessment/Plan   Brooklyn was seen today for anxiety.    Diagnoses and all orders for this visit:    Anxiety    End-stage renal disease on hemodialysis (CMS/HCC)    Type 2 diabetes mellitus with chronic kidney disease on chronic dialysis, without long-term current use of insulin (CMS/HCC)    Osteomyelitis of metatarsal (CMS/HCC)    Nonischemic cardiomyopathy (CMS/HCC)    Paroxysmal atrial fibrillation (CMS/HCC)    Breast cancer screening  -     Mammo Screening Bilateral With CAD; Future      -reviewed basim;  On dialysis and not able give urine  -lorazepam already refilled  -wound care addresses osteo  -due for breast cancer screening       -Follow up: 6 months and prn  "

## 2018-07-30 NOTE — PROGRESS NOTES
Dear Brooklyn Galvan:    Your mammogram was normal.   We have enclosed a copy.    Sincerely,  JESSICA Hernadez DO, MS

## 2018-08-10 DIAGNOSIS — F41.9 ANXIETY: ICD-10-CM

## 2018-08-13 RX ORDER — LORAZEPAM 1 MG/1
TABLET ORAL
Qty: 60 TABLET | Refills: 1 | OUTPATIENT
Start: 2018-08-13

## 2018-12-13 DIAGNOSIS — F41.9 ANXIETY: ICD-10-CM

## 2018-12-13 RX ORDER — PAROXETINE HYDROCHLORIDE 40 MG/1
TABLET, FILM COATED ORAL
Qty: 45 TABLET | Refills: 6 | Status: SHIPPED | OUTPATIENT
Start: 2018-12-13 | End: 2019-12-03 | Stop reason: SDUPTHER

## 2019-01-03 ENCOUNTER — TELEPHONE (OUTPATIENT)
Dept: FAMILY MEDICINE CLINIC | Facility: CLINIC | Age: 79
End: 2019-01-03

## 2019-01-03 DIAGNOSIS — F41.9 ANXIETY: ICD-10-CM

## 2019-01-04 RX ORDER — LORAZEPAM 1 MG/1
TABLET ORAL
Qty: 60 TABLET | Refills: 1 | OUTPATIENT
Start: 2019-01-04

## 2019-01-04 NOTE — TELEPHONE ENCOUNTER
rx called to pharmacy #[phone] refills needs ov and uds jm   Spine appears normal, range of motion is not limited, no muscle or joint tenderness

## 2019-01-15 RX ORDER — WARFARIN SODIUM 3 MG/1
TABLET ORAL
Qty: 30 TABLET | Refills: 4 | Status: SHIPPED | OUTPATIENT
Start: 2019-01-15 | End: 2019-07-02 | Stop reason: SDUPTHER

## 2019-02-05 ENCOUNTER — OFFICE VISIT (OUTPATIENT)
Dept: FAMILY MEDICINE CLINIC | Facility: CLINIC | Age: 79
End: 2019-02-05

## 2019-02-05 VITALS
WEIGHT: 210 LBS | SYSTOLIC BLOOD PRESSURE: 130 MMHG | DIASTOLIC BLOOD PRESSURE: 52 MMHG | BODY MASS INDEX: 33.75 KG/M2 | HEIGHT: 66 IN | OXYGEN SATURATION: 90 % | HEART RATE: 85 BPM

## 2019-02-05 DIAGNOSIS — N18.6 END-STAGE RENAL DISEASE ON HEMODIALYSIS (HCC): ICD-10-CM

## 2019-02-05 DIAGNOSIS — I42.8 NONISCHEMIC CARDIOMYOPATHY (HCC): ICD-10-CM

## 2019-02-05 DIAGNOSIS — I25.10 CORONARY ARTERY DISEASE INVOLVING NATIVE CORONARY ARTERY OF NATIVE HEART WITHOUT ANGINA PECTORIS: ICD-10-CM

## 2019-02-05 DIAGNOSIS — I48.0 PAROXYSMAL ATRIAL FIBRILLATION (HCC): ICD-10-CM

## 2019-02-05 DIAGNOSIS — I10 BENIGN ESSENTIAL HYPERTENSION: ICD-10-CM

## 2019-02-05 DIAGNOSIS — N18.6 TYPE 2 DIABETES MELLITUS WITH CHRONIC KIDNEY DISEASE ON CHRONIC DIALYSIS, WITHOUT LONG-TERM CURRENT USE OF INSULIN (HCC): ICD-10-CM

## 2019-02-05 DIAGNOSIS — Z99.2 END-STAGE RENAL DISEASE ON HEMODIALYSIS (HCC): ICD-10-CM

## 2019-02-05 DIAGNOSIS — Z00.00 MEDICARE ANNUAL WELLNESS VISIT, SUBSEQUENT: Primary | ICD-10-CM

## 2019-02-05 DIAGNOSIS — Z23 IMMUNIZATION DUE: ICD-10-CM

## 2019-02-05 DIAGNOSIS — E11.22 TYPE 2 DIABETES MELLITUS WITH CHRONIC KIDNEY DISEASE ON CHRONIC DIALYSIS, WITHOUT LONG-TERM CURRENT USE OF INSULIN (HCC): ICD-10-CM

## 2019-02-05 DIAGNOSIS — Z99.2 TYPE 2 DIABETES MELLITUS WITH CHRONIC KIDNEY DISEASE ON CHRONIC DIALYSIS, WITHOUT LONG-TERM CURRENT USE OF INSULIN (HCC): ICD-10-CM

## 2019-02-05 DIAGNOSIS — F41.9 ANXIETY: ICD-10-CM

## 2019-02-05 PROCEDURE — G0439 PPPS, SUBSEQ VISIT: HCPCS | Performed by: FAMILY MEDICINE

## 2019-02-05 PROCEDURE — 90670 PCV13 VACCINE IM: CPT | Performed by: FAMILY MEDICINE

## 2019-02-05 PROCEDURE — 99213 OFFICE O/P EST LOW 20 MIN: CPT | Performed by: FAMILY MEDICINE

## 2019-02-05 PROCEDURE — G0008 ADMIN INFLUENZA VIRUS VAC: HCPCS | Performed by: FAMILY MEDICINE

## 2019-02-05 RX ORDER — LORAZEPAM 1 MG/1
1 TABLET ORAL 2 TIMES DAILY PRN
Qty: 60 TABLET | Refills: 2 | Status: SHIPPED | OUTPATIENT
Start: 2019-02-05 | End: 2019-02-28 | Stop reason: SDUPTHER

## 2019-02-05 NOTE — PATIENT INSTRUCTIONS
Calorie Counting for Weight Loss  Calories are units of energy. Your body needs a certain amount of calories from food to keep you going throughout the day. When you eat more calories than your body needs, your body stores the extra calories as fat. When you eat fewer calories than your body needs, your body burns fat to get the energy it needs.  Calorie counting means keeping track of how many calories you eat and drink each day. Calorie counting can be helpful if you need to lose weight. If you make sure to eat fewer calories than your body needs, you should lose weight. Ask your health care provider what a healthy weight is for you.  For calorie counting to work, you will need to eat the right number of calories in a day in order to lose a healthy amount of weight per week. A dietitian can help you determine how many calories you need in a day and will give you suggestions on how to reach your calorie goal.  · A healthy amount of weight to lose per week is usually 1-2 lb (0.5-0.9 kg). This usually means that your daily calorie intake should be reduced by 500-750 calories.  · Eating 1,200 - 1,500 calories per day can help most women lose weight.  · Eating 1,500 - 1,800 calories per day can help most men lose weight.    What do I need to know about calorie counting?  In order to meet your daily calorie goal, you will need to:  · Find out how many calories are in each food you would like to eat. Try to do this before you eat.  · Decide how much of the food you plan to eat.  · Write down what you ate and how many calories it had. Doing this is called keeping a food log.    To successfully lose weight, it is important to balance calorie counting with a healthy lifestyle that includes regular activity. Aim for 150 minutes of moderate exercise (such as walking) or 75 minutes of vigorous exercise (such as running) each week.  Where do I find calorie information?    The number of calories in a food can be found on a  Nutrition Facts label. If a food does not have a Nutrition Facts label, try to look up the calories online or ask your dietitian for help.  Remember that calories are listed per serving. If you choose to have more than one serving of a food, you will have to multiply the calories per serving by the amount of servings you plan to eat. For example, the label on a package of bread might say that a serving size is 1 slice and that there are 90 calories in a serving. If you eat 1 slice, you will have eaten 90 calories. If you eat 2 slices, you will have eaten 180 calories.  How do I keep a food log?  Immediately after each meal, record the following information in your food log:  · What you ate. Don't forget to include toppings, sauces, and other extras on the food.  · How much you ate. This can be measured in cups, ounces, or number of items.  · How many calories each food and drink had.  · The total number of calories in the meal.    Keep your food log near you, such as in a small notebook in your pocket, or use a mobile kimberli or website. Some programs will calculate calories for you and show you how many calories you have left for the day to meet your goal.  What are some calorie counting tips?  · Use your calories on foods and drinks that will fill you up and not leave you hungry:  ? Some examples of foods that fill you up are nuts and nut butters, vegetables, lean proteins, and high-fiber foods like whole grains. High-fiber foods are foods with more than 5 g fiber per serving.  ? Drinks such as sodas, specialty coffee drinks, alcohol, and juices have a lot of calories, yet do not fill you up.  · Eat nutritious foods and avoid empty calories. Empty calories are calories you get from foods or beverages that do not have many vitamins or protein, such as candy, sweets, and soda. It is better to have a nutritious high-calorie food (such as an avocado) than a food with few nutrients (such as a bag of chips).  · Know how  "many calories are in the foods you eat most often. This will help you calculate calorie counts faster.  · Pay attention to calories in drinks. Low-calorie drinks include water and unsweetened drinks.  · Pay attention to nutrition labels for \"low fat\" or \"fat free\" foods. These foods sometimes have the same amount of calories or more calories than the full fat versions. They also often have added sugar, starch, or salt, to make up for flavor that was removed with the fat.  · Find a way of tracking calories that works for you. Get creative. Try different apps or programs if writing down calories does not work for you.  What are some portion control tips?  · Know how many calories are in a serving. This will help you know how many servings of a certain food you can have.  · Use a measuring cup to measure serving sizes. You could also try weighing out portions on a kitchen scale. With time, you will be able to estimate serving sizes for some foods.  · Take some time to put servings of different foods on your favorite plates, bowls, and cups so you know what a serving looks like.  · Try not to eat straight from a bag or box. Doing this can lead to overeating. Put the amount you would like to eat in a cup or on a plate to make sure you are eating the right portion.  · Use smaller plates, glasses, and bowls to prevent overeating.  · Try not to multitask (for example, watch TV or use your computer) while eating. If it is time to eat, sit down at a table and enjoy your food. This will help you to know when you are full. It will also help you to be aware of what you are eating and how much you are eating.  What are tips for following this plan?  Reading food labels  · Check the calorie count compared to the serving size. The serving size may be smaller than what you are used to eating.  · Check the source of the calories. Make sure the food you are eating is high in vitamins and protein and low in saturated and trans " fats.  Shopping  · Read nutrition labels while you shop. This will help you make healthy decisions before you decide to purchase your food.  · Make a grocery list and stick to it.  Cooking  · Try to cook your favorite foods in a healthier way. For example, try baking instead of frying.  · Use low-fat dairy products.  Meal planning  · Use more fruits and vegetables. Half of your plate should be fruits and vegetables.  · Include lean proteins like poultry and fish.  How do I count calories when eating out?  · Ask for smaller portion sizes.  · Consider sharing an entree and sides instead of getting your own entree.  · If you get your own entree, eat only half. Ask for a box at the beginning of your meal and put the rest of your entree in it so you are not tempted to eat it.  · If calories are listed on the menu, choose the lower calorie options.  · Choose dishes that include vegetables, fruits, whole grains, low-fat dairy products, and lean protein.  · Choose items that are boiled, broiled, grilled, or steamed. Stay away from items that are buttered, battered, fried, or served with cream sauce. Items labeled “crispy” are usually fried, unless stated otherwise.  · Choose water, low-fat milk, unsweetened iced tea, or other drinks without added sugar. If you want an alcoholic beverage, choose a lower calorie option such as a glass of wine or light beer.  · Ask for dressings, sauces, and syrups on the side. These are usually high in calories, so you should limit the amount you eat.  · If you want a salad, choose a garden salad and ask for grilled meats. Avoid extra toppings like griggs, cheese, or fried items. Ask for the dressing on the side, or ask for olive oil and vinegar or lemon to use as dressing.  · Estimate how many servings of a food you are given. For example, a serving of cooked rice is ½ cup or about the size of half a baseball. Knowing serving sizes will help you be aware of how much food you are eating at  restaurants. The list below tells you how big or small some common portion sizes are based on everyday objects:  ? 1 oz--4 stacked dice.  ? 3 oz--1 deck of cards.  ? 1 tsp--1 die.  ? 1 Tbsp--½ a ping-pong ball.  ? 2 Tbsp--1 ping-pong ball.  ? ½ cup--½ baseball.  ? 1 cup--1 baseball.  Summary  · Calorie counting means keeping track of how many calories you eat and drink each day. If you eat fewer calories than your body needs, you should lose weight.  · A healthy amount of weight to lose per week is usually 1-2 lb (0.5-0.9 kg). This usually means reducing your daily calorie intake by 500-750 calories.  · The number of calories in a food can be found on a Nutrition Facts label. If a food does not have a Nutrition Facts label, try to look up the calories online or ask your dietitian for help.  · Use your calories on foods and drinks that will fill you up, and not on foods and drinks that will leave you hungry.  · Use smaller plates, glasses, and bowls to prevent overeating.  This information is not intended to replace advice given to you by your health care provider. Make sure you discuss any questions you have with your health care provider.  Document Released: 12/18/2006 Document Revised: 11/17/2017 Document Reviewed: 11/17/2017  [employer] Patient Education © 2018 [employer]  Advance Directive  Advance directives are legal documents that let you make choices ahead of time about your health care and medical treatment in case you become unable to communicate for yourself. Advance directives are a way for you to communicate your wishes to family, friends, and health care providers. This can help convey your decisions about end-of-life care if you become unable to communicate.  Discussing and writing advance directives should happen over time rather than all at once. Advance directives can be changed depending on your situation and what you want, even after you have signed the advance directives.  If you do  not have an advance directive, some states assign family decision makers to act on your behalf based on how closely you are related to them. Each state has its own laws regarding advance directives. You may want to check with your health care provider, , or state representative about the laws in your state. There are different types of advance directives, such as:  · Medical power of .  · Living will.  · Do not resuscitate (DNR) or do not attempt resuscitation (DNAR) order.    Health care proxy and medical power of   A health care proxy, also called a health care agent, is a person who is appointed to make medical decisions for you in cases in which you are unable to make the decisions yourself. Generally, people choose someone they know well and trust to represent their preferences. Make sure to ask this person for an agreement to act as your proxy. A proxy may have to exercise judgment in the event of a medical decision for which your wishes are not known.  A medical power of  is a legal document that names your health care proxy. Depending on the laws in your state, after the document is written, it may also need to be:  · Signed.  · Notarized.  · Dated.  · Copied.  · Witnessed.  · Incorporated into your medical record.    You may also want to appoint someone to manage your financial affairs in a situation in which you are unable to do so. This is called a durable power of  for finances. It is a separate legal document from the durable power of  for health care. You may choose the same person or someone different from your health care proxy to act as your agent in financial matters.  If you do not appoint a proxy, or if there is a concern that the proxy is not acting in your best interests, a court-appointed guardian may be designated to act on your behalf.  Living will  A living will is a set of instructions documenting your wishes about medical care when you  cannot express them yourself. Health care providers should keep a copy of your living will in your medical record. You may want to give a copy to family members or friends. To alert caregivers in case of an emergency, you can place a card in your wallet to let them know that you have a living will and where they can find it. A living will is used if you become:  · Terminally ill.  · Incapacitated.  · Unable to communicate or make decisions.    Items to consider in your living will include:  · The use or non-use of life-sustaining equipment, such as dialysis machines and breathing machines (ventilators).  · A DNR or DNAR order, which is the instruction not to use cardiopulmonary resuscitation (CPR) if breathing or heartbeat stops.  · The use or non-use of tube feeding.  · Withholding of food and fluids.  · Comfort (palliative) care when the goal becomes comfort rather than a cure.  · Organ and tissue donation.    A living will does not give instructions for distributing your money and property if you should pass away. It is recommended that you seek the advice of a  when writing a will. Decisions about taxes, beneficiaries, and asset distribution will be legally binding. This process can relieve your family and friends of any concerns surrounding disputes or questions that may come up about the distribution of your assets.  DNR or DNAR  A DNR or DNAR order is a request not to have CPR in the event that your heart stops beating or you stop breathing. If a DNR or DNAR order has not been made and shared, a health care provider will try to help any patient whose heart has stopped or who has stopped breathing. If you plan to have surgery, talk with your health care provider about how your DNR or DNAR order will be followed if problems occur.  Summary  · Advance directives are the legal documents that allow you to make choices ahead of time about your health care and medical treatment in case you become unable to  communicate for yourself.  · The process of discussing and writing advance directives should happen over time. You can change the advance directives, even after you have signed them.  · Advance directives include DNR or DNAR orders, living mays, and designating an agent as your medical power of .  This information is not intended to replace advice given to you by your health care provider. Make sure you discuss any questions you have with your health care provider.  Document Released: 03/26/2009 Document Revised: 11/06/2017 Document Reviewed: 11/06/2017  [employer] Interactive Patient Education © 2017 [employer]

## 2019-02-05 NOTE — PROGRESS NOTES
QUICK REFERENCE INFORMATION:  The ABCs of the Annual Wellness Visit    Subsequent Medicare Wellness Visit    HEALTH RISK ASSESSMENT    1940    Recent Hospitalizations:  No hospitalization(s) within the last year..        Current Medical Providers:  Patient Care Team:  Michael Hernadez DO as PCP - General (Family Medicine)  Gloria Thorpe MD as PCP - Claims Attributed        Smoking Status:  Social History     Tobacco Use   Smoking Status Former Smoker   Smokeless Tobacco Never Used       Alcohol Consumption:  Social History     Substance and Sexual Activity   Alcohol Use Defer    Comment: denied       Depression Screen:   PHQ-2/PHQ-9 Depression Screening 2/5/2019   Little interest or pleasure in doing things 0   Feeling down, depressed, or hopeless 0   Trouble falling or staying asleep, or sleeping too much 0   Feeling tired or having little energy 1   Poor appetite or overeating 0   Feeling bad about yourself - or that you are a failure or have let yourself or your family down 0   Trouble concentrating on things, such as reading the newspaper or watching television 0   Moving or speaking so slowly that other people could have noticed. Or the opposite - being so fidgety or restless that you have been moving around a lot more than usual 0   Thoughts that you would be better off dead, or of hurting yourself in some way 0   Total Score 1   If you checked off any problems, how difficult have these problems made it for you to do your work, take care of things at home, or get along with other people? Not difficult at all       Health Habits and Functional and Cognitive Screening:  Functional & Cognitive Status 2/5/2019   Do you have difficulty preparing food and eating? No   Do you have difficulty bathing yourself, getting dressed or grooming yourself? No   Do you have difficulty using the toilet? No   Do you have difficulty moving around from place to place? Yes   Do you have trouble with steps or getting out of a  bed or a chair? No   In the past year have you fallen or experienced a near fall? Yes   Current Diet Well Balanced Diet   Dental Exam Not up to date   Eye Exam Up to date   Exercise (times per week) 4 times per week   Current Exercise Activities Include Walking   Do you need help using the phone?  No   Are you deaf or do you have serious difficulty hearing?  No   Do you need help with transportation? No   Do you need help shopping? No   Do you need help preparing meals?  No   Do you need help with housework?  No   Do you need help with laundry? No   Do you need help taking your medications? No   Do you need help managing money? No   Do you ever drive or ride in a car without wearing a seat belt? No   Have you felt unusual stress, anger or loneliness in the last month? No   Who do you live with? Spouse   If you need help, do you have trouble finding someone available to you? No   Have you been bothered in the last four weeks by sexual problems? No   Do you have difficulty concentrating, remembering or making decisions? No           Does the patient have evidence of cognitive impairment? abnormal clock    Aspirin use counseling: Does not need ASA (and currently is not on it)      Recent Lab Results:  CMP:  Lab Results   Component Value Date     (H) 03/14/2017    BUN 28 (H) 03/14/2017    CREATININE 6.68 (H) 03/14/2017    EGFRIFNONA 6 (L) 03/14/2017    EGFRIFAFRI 7 (L) 03/14/2017    BCR 4.2 (L) 03/14/2017     03/14/2017    K 4.8 03/14/2017    CO2 21.2 (L) 03/14/2017    CALCIUM 9.1 03/14/2017    PROTENTOTREF 6.9 03/14/2017    ALBUMIN 4.10 03/14/2017    LABGLOBREF 2.8 03/14/2017    LABIL2 1.5 03/14/2017    BILITOT 0.7 03/14/2017    ALKPHOS 110 03/14/2017    AST 34 (H) 03/14/2017    ALT 33 03/14/2017     Lipid Panel:     HbA1c:  Lab Results   Component Value Date    HGBA1C 4.70 (L) 03/14/2017       Visual Acuity:  No exam data present    Age-appropriate Screening Schedule:  Refer to the list below for future  screening recommendations based on patient's age, sex and/or medical conditions. Orders for these recommended tests are listed in the plan section. The patient has been provided with a written plan.    Health Maintenance   Topic Date Due   • ZOSTER VACCINE (2 of 2) 12/23/2016   • HEMOGLOBIN A1C  09/14/2017   • LIPID PANEL  03/13/2018   • URINE MICROALBUMIN  03/13/2018   • DXA SCAN  03/13/2019   • MAMMOGRAM  07/20/2020   • TDAP/TD VACCINES (2 - Td) 10/28/2026   • COLONOSCOPY  12/27/2026   • INFLUENZA VACCINE  Completed   • PNEUMOCOCCAL VACCINES (65+ LOW/MEDIUM RISK)  Completed        Subjective   History of Present Illness    Brooklyn Galvan is a 78 y.o. female who presents for an Subsequent Wellness Visit.    The following portions of the patient's history were reviewed and updated as appropriate: allergies, current medications, past family history, past medical history, past social history, past surgical history and problem list.    Outpatient Medications Prior to Visit   Medication Sig Dispense Refill   • amiodarone (PACERONE) 200 MG tablet Take 100 mg by mouth Daily.     • atorvastatin (LIPITOR) 10 MG tablet Take 10 mg by mouth daily. ONE QD FOR CHOLESTEROL & HEART HEALTH     • b complex-C-folic acid 1 MG capsule Take 1 capsule by mouth daily.     • bimatoprost (LUMIGAN) 0.01 % ophthalmic drops Apply 1 drop to eye daily.     • levothyroxine (SYNTHROID, LEVOTHROID) 50 MCG tablet Take 50 mcg by mouth Daily.     • PARoxetine (PAXIL) 40 MG tablet TAKE ONE AND ONE-HALF TABLET BY MOUTH DAILY 45 tablet 6   • sevelamer (RENVELA) 800 MG tablet Take 800 mg by mouth 3 (Three) Times a Day With Meals.     • sodium bicarbonate 650 MG tablet Take 650 mg by mouth 4 (Four) Times a Day.     • STOOL SOFTENER 100 MG capsule TAKE ONE CAPSULE BY MOUTH TWICE A DAY 60 capsule 9   • timolol (TIMOPTIC) 0.25 % ophthalmic solution 1 drop 2 (Two) Times a Day.     • vitamin D (ERGOCALCIFEROL) 35229 UNITS capsule capsule Take 50,000 Units  by mouth 1 (One) Time Per Week.     • warfarin (COUMADIN) 3 MG tablet TAKE ONE TABLET BY MOUTH DAILY AS DIRECTED BY PRESCRIBER 30 tablet 4   • warfarin (COUMADIN) 5 MG tablet      • LORazepam (ATIVAN) 1 MG tablet Take 1 tablet by mouth 2 (Two) Times a Day As Needed for Anxiety. 1/2 to 1 in am and 1 pm prn anxiety. 60 tablet 2     No facility-administered medications prior to visit.        Patient Active Problem List   Diagnosis   • Vitamin D deficiency   • Anemia of chronic renal failure   • Benign essential hypertension   • Chronic kidney disease, stage V (CMS/[facility])   • Constipation   • Type 2 diabetes mellitus with chronic kidney disease on chronic dialysis, without long-term current use of insulin (CMS/[facility])   • Dyslipidemia   • Generalized anxiety disorder   • Low back pain   • Post-menopausal osteoporosis   • End-stage renal disease on hemodialysis (CMS/[facility])   • Paroxysmal atrial fibrillation (CMS/[facility])   • Coronary artery disease involving native coronary artery   • Chronic anticoagulation   • Non-rheumatic mitral regurgitation   • Pulmonary hypertension (CMS/[facility])   • ANDREA (obstructive sleep apnea)   • Nonischemic cardiomyopathy (CMS/[facility])   • Anxiety   • Adiposity   • Osteomyelitis of metatarsal (CMS/[facility])       Advance Care Planning:  has NO advance directive - information provided to the patient today    Identification of Risk Factors:  Risk factors include: weight .    Review of Systems   Constitutional: Unexpected weight change: Weight gain or loss.   Respiratory: Negative for shortness of breath and wheezing.    Cardiovascular: Negative for chest pain, palpitations and leg swelling.   Gastrointestinal: Negative for abdominal pain, nausea and vomiting.   Musculoskeletal: Negative for myalgias.   Neurological: Negative for dizziness, tremors, syncope, facial asymmetry, speech difficulty, weakness, light-headedness, numbness and headaches.   Psychiatric/Behavioral: The patient is nervous/anxious.        Compared  "to one year ago, the patient feels her physical health is the same.  Compared to one year ago, the patient feels her mental health is the same.    Objective     Physical Exam   Constitutional: She appears well-developed and well-nourished.   HENT:   Head: Normocephalic and atraumatic.   Neck: Neck supple. No JVD present. No thyromegaly present.   Cardiovascular: Normal rate, regular rhythm and normal heart sounds. Exam reveals no gallop and no friction rub.   No murmur heard.  Pulmonary/Chest: Effort normal and breath sounds normal. No respiratory distress. She has no wheezes. She has no rales.   Abdominal: Soft. Bowel sounds are normal. She exhibits no distension. There is no tenderness. There is no rebound and no guarding.   Musculoskeletal: She exhibits no edema.   Neurological: She is alert.   Skin: Skin is warm and dry.   Psychiatric: She has a normal mood and affect. Her behavior is normal.   Nursing note and vitals reviewed.      Vitals:    02/05/19 1034   BP: 130/52   BP Location: Left arm   Patient Position: Sitting   Cuff Size: Adult   Pulse: 85   SpO2: 90%   Weight: 95.3 kg (210 lb)   Height: 166.4 cm (65.5\")   PainSc: 0-No pain       Patient's Body mass index is 34.41 kg/m². BMI is above normal parameters. Recommendations include: educational material.      Assessment/Plan   Patient Self-Management and Personalized Health Advice  The patient has been provided with information about: diet and preventive services including:   · Advance directive.    Visit Diagnoses:    ICD-10-CM ICD-9-CM   1. Medicare annual wellness visit, subsequent Z00.00 V70.0   2. Nonischemic cardiomyopathy (CMS/[facility]) I42.8 425.4   3. Coronary artery disease involving native coronary artery of native heart without angina pectoris I25.10 414.01   4. Benign essential hypertension I10 401.1   5. Type 2 diabetes mellitus with chronic kidney disease on chronic dialysis, without long-term current use of insulin (CMS/[facility]) E11.22 250.40    " N18.6 585.9    Z99.2 V45.11   6. End-stage renal disease on hemodialysis (CMS/[facility]) N18.6 585.6    Z99.2 V45.11   7. Paroxysmal atrial fibrillation (CMS/[facility]) I48.0 427.31   8. Immunization due Z23 V05.9   9. Anxiety F41.9 300.00       No orders of the defined types were placed in this encounter.      Outpatient Encounter Medications as of 2/5/2019   Medication Sig Dispense Refill   • amiodarone (PACERONE) 200 MG tablet Take 100 mg by mouth Daily.     • atorvastatin (LIPITOR) 10 MG tablet Take 10 mg by mouth daily. ONE QD FOR CHOLESTEROL & HEART HEALTH     • b complex-C-folic acid 1 MG capsule Take 1 capsule by mouth daily.     • bimatoprost (LUMIGAN) 0.01 % ophthalmic drops Apply 1 drop to eye daily.     • levothyroxine (SYNTHROID, LEVOTHROID) 50 MCG tablet Take 50 mcg by mouth Daily.     • LORazepam (ATIVAN) 1 MG tablet Take 1 tablet by mouth 2 (Two) Times a Day As Needed for Anxiety. 1/2 to 1 in am and 1 pm prn anxiety. 60 tablet 2   • PARoxetine (PAXIL) 40 MG tablet TAKE ONE AND ONE-HALF TABLET BY MOUTH DAILY 45 tablet 6   • sevelamer (RENVELA) 800 MG tablet Take 800 mg by mouth 3 (Three) Times a Day With Meals.     • sodium bicarbonate 650 MG tablet Take 650 mg by mouth 4 (Four) Times a Day.     • STOOL SOFTENER 100 MG capsule TAKE ONE CAPSULE BY MOUTH TWICE A DAY 60 capsule 9   • timolol (TIMOPTIC) 0.25 % ophthalmic solution 1 drop 2 (Two) Times a Day.     • vitamin D (ERGOCALCIFEROL) 22026 UNITS capsule capsule Take 50,000 Units by mouth 1 (One) Time Per Week.     • warfarin (COUMADIN) 3 MG tablet TAKE ONE TABLET BY MOUTH DAILY AS DIRECTED BY PRESCRIBER 30 tablet 4   • warfarin (COUMADIN) 5 MG tablet      • [DISCONTINUED] LORazepam (ATIVAN) 1 MG tablet Take 1 tablet by mouth 2 (Two) Times a Day As Needed for Anxiety. 1/2 to 1 in am and 1 pm prn anxiety. 60 tablet 2     Facility-Administered Encounter Medications as of 2/5/2019   Medication Dose Route Frequency Provider Last Rate Last Dose   • [COMPLETED]  pneumococcal conj. 13-valent (PREVNAR-13) vaccine 0.5 mL  0.5 mL Intramuscular Once Michael Hernadez DO   0.5 mL at 02/05/19 1146       Reviewed use of high risk medication in the elderly: yes  Reviewed for potential of harmful drug interactions in the elderly: yes    Follow Up:  Return in about 6 months (around 8/5/2019), or if symptoms worsen or fail to improve.     An After Visit Summary and PPPS with all of these plans were given to the patient.           ++++++++++++++++++++++++++++++++++++++++++++++++++++++++++++++++++     CC:dm2, cad, paf, htn;  Seeing wound care for non-healing wound;  ESRD on HD.  HPI  Patient 77 y/o female with CAD, hx of PAF on chronic anticoagulation mgmt per cardiology.  She also has type 2 diabetes with mgmt per Dr. Thorpe, reports last a1c down to 5%.  She also has arterial htn that has been reasonably controlled.  Denies cp/dyspnea;  No palpitations;  She also has ESRD and is on HD Mo,We, Fr.   She takes lorazepam for anxiety and muscle aches with dialysis, needs refills today and is aware of risks.    Review of Systems   Constitution: Negative for weakness. Unexpected weight change: Weight gain or loss.   Cardiovascular: Negative for chest pain, leg swelling and palpitations.   Respiratory: Negative for shortness of breath and wheezing.    Musculoskeletal: Negative for myalgias.   Gastrointestinal: Negative for abdominal pain, nausea and vomiting.   Neurological: Negative for dizziness, facial asymmetry, headaches, light-headedness, numbness, speech difficulty, syncope and tremors.   Psychiatric/Behavioral: The patient is nervous/anxious.        Social History     Tobacco Use   • Smoking status: Former Smoker   • Smokeless tobacco: Never Used   Substance Use Topics   • Alcohol use: Defer     Comment: denied     O:   Vitals:    02/05/19 1034   BP: 130/52   BP Location: Left arm   Patient Position: Sitting   Cuff Size: Adult   Pulse: 85   SpO2: 90%   Weight: 95.3 kg (210 lb)  "  Height: 166.4 cm (65.5\")   PainSc: 0-No pain       Physical Exam   Constitutional: She appears well-developed and well-nourished.   HENT:   Head: Normocephalic and atraumatic.   Neck: Neck supple. No JVD present. No thyromegaly present.   Cardiovascular: Normal rate, regular rhythm and normal heart sounds. Exam reveals no gallop and no friction rub.   No murmur heard.  Pulmonary/Chest: Effort normal and breath sounds normal. No respiratory distress. She has no wheezes. She has no rales.   Abdominal: Soft. Bowel sounds are normal. She exhibits no distension. There is no tenderness. There is no rebound and no guarding.   Musculoskeletal: She exhibits no edema.   Neurological: She is alert.   Skin: Skin is warm and dry.   Psychiatric: She has a normal mood and affect. Her behavior is normal.   Nursing note and vitals reviewed.      Brooklyn was seen today for anxiety and annual exam.    Diagnoses and all orders for this visit:    Medicare annual wellness visit, subsequent    Nonischemic cardiomyopathy (CMS/[facility])    Coronary artery disease involving native coronary artery of native heart without angina pectoris    Benign essential hypertension    Type 2 diabetes mellitus with chronic kidney disease on chronic dialysis, without long-term current use of insulin (CMS/[facility])    End-stage renal disease on hemodialysis (CMS/[facility])    Paroxysmal atrial fibrillation (CMS/[facility])    Immunization due  -     pneumococcal conj. 13-valent (PREVNAR-13) vaccine 0.5 mL; Inject 0.5 mL into the appropriate muscle as directed by prescriber 1 (One) Time.    Anxiety  -     LORazepam (ATIVAN) 1 MG tablet; Take 1 tablet by mouth 2 (Two) Times a Day As Needed for Anxiety. 1/2 to 1 in am and 1 pm prn anxiety.    -gave rx for ativan  -hypertension - controlled, continue medications  -f/u with cardiology for paf, cad  -f/u with endocrinology for dm2  -f/u with renal for ESRD and continued HD    Return in about 6 months (around 8/5/2019), or if " symptoms worsen or fail to improve.

## 2019-02-27 DIAGNOSIS — F41.9 ANXIETY: ICD-10-CM

## 2019-02-27 RX ORDER — LORAZEPAM 1 MG/1
TABLET ORAL
Qty: 60 TABLET | Refills: 1 | Status: CANCELLED | OUTPATIENT
Start: 2019-02-27

## 2019-02-28 DIAGNOSIS — F41.9 ANXIETY: ICD-10-CM

## 2019-03-01 RX ORDER — LORAZEPAM 1 MG/1
TABLET ORAL
Qty: 60 TABLET | Refills: 2 | Status: SHIPPED | OUTPATIENT
Start: 2019-03-01 | End: 2019-07-23 | Stop reason: SDUPTHER

## 2019-04-02 ENCOUNTER — OFFICE VISIT (OUTPATIENT)
Dept: FAMILY MEDICINE CLINIC | Facility: CLINIC | Age: 79
End: 2019-04-02

## 2019-04-02 VITALS
WEIGHT: 204 LBS | HEIGHT: 66 IN | SYSTOLIC BLOOD PRESSURE: 128 MMHG | BODY MASS INDEX: 32.78 KG/M2 | HEART RATE: 66 BPM | TEMPERATURE: 98.8 F | DIASTOLIC BLOOD PRESSURE: 70 MMHG | OXYGEN SATURATION: 93 %

## 2019-04-02 DIAGNOSIS — H66.001 ACUTE SUPPURATIVE OTITIS MEDIA OF RIGHT EAR WITHOUT SPONTANEOUS RUPTURE OF TYMPANIC MEMBRANE, RECURRENCE NOT SPECIFIED: Primary | ICD-10-CM

## 2019-04-02 DIAGNOSIS — J40 BRONCHITIS: ICD-10-CM

## 2019-04-02 PROCEDURE — 99213 OFFICE O/P EST LOW 20 MIN: CPT | Performed by: NURSE PRACTITIONER

## 2019-04-02 RX ORDER — ONDANSETRON 4 MG/1
4 TABLET, ORALLY DISINTEGRATING ORAL EVERY 8 HOURS PRN
Qty: 21 TABLET | Refills: 0 | Status: SHIPPED | OUTPATIENT
Start: 2019-04-02 | End: 2021-01-11 | Stop reason: SDUPTHER

## 2019-04-02 RX ORDER — DOXYCYCLINE HYCLATE 100 MG/1
100 CAPSULE ORAL 2 TIMES DAILY
Qty: 14 CAPSULE | Refills: 0 | Status: SHIPPED | OUTPATIENT
Start: 2019-04-02 | End: 2019-07-26

## 2019-04-02 RX ORDER — ONDANSETRON 4 MG/1
4 TABLET, ORALLY DISINTEGRATING ORAL EVERY 6 HOURS PRN
Status: DISCONTINUED | OUTPATIENT
Start: 2019-04-02 | End: 2019-07-23

## 2019-04-02 RX ADMIN — ONDANSETRON 4 MG: 4 TABLET, ORALLY DISINTEGRATING ORAL at 15:47

## 2019-04-02 NOTE — PROGRESS NOTES
"Subjective   Brooklyn Galvan is a 79 y.o. female who presents to us today with head congestion, nausea, vomiting, diarrhea and productive (yellow) cough.     History of Present Illness   Cough is severe at night, causing nausea, productive cough, vomiting. Diarrhea, ear pain.   The following portions of the patient's history were reviewed and updated as appropriate: allergies, current medications, past family history, past medical history, past social history, past surgical history and problem list.    Review of Systems   Constitutional: Positive for chills and fever (initially).   HENT: Positive for congestion, ear pain, rhinorrhea and sore throat (initially).    Respiratory: Positive for cough. Negative for wheezing.    Cardiovascular: Negative.    Gastrointestinal: Positive for diarrhea, nausea and vomiting.   Musculoskeletal: Negative.    Psychiatric/Behavioral: Negative.      /70 (BP Location: Left arm, Patient Position: Sitting, Cuff Size: Adult)   Pulse 66   Temp 98.8 °F (37.1 °C) (Oral)   Ht 166.4 cm (65.5\")   Wt 92.5 kg (204 lb)   SpO2 93%   BMI 33.43 kg/m²     Objective   Physical Exam   Constitutional: She is oriented to person, place, and time. She appears well-developed and well-nourished.   HENT:   Right Ear: Tympanic membrane normal.   Left Ear: A middle ear effusion is present.   Nose: Mucosal edema present. Right sinus exhibits no maxillary sinus tenderness and no frontal sinus tenderness. Left sinus exhibits no maxillary sinus tenderness and no frontal sinus tenderness.   Mouth/Throat: Uvula is midline, oropharynx is clear and moist and mucous membranes are normal.   Neck: Normal range of motion. Neck supple. No tracheal deviation present. No thyromegaly present.   Cardiovascular: Normal rate, regular rhythm and normal heart sounds.   Pulmonary/Chest: Effort normal. She has wheezes (scattered).   Abdominal: Soft. Bowel sounds are normal. She exhibits no distension. There is no " tenderness. There is no rebound and no guarding.   Musculoskeletal: Normal range of motion.   Lymphadenopathy:     She has no cervical adenopathy.   Neurological: She is alert and oriented to person, place, and time.   Skin: Skin is warm and dry.   Psychiatric: She has a normal mood and affect. Her behavior is normal. Judgment and thought content normal.   Nursing note and vitals reviewed.    Assessment/Plan   Problems Addressed this Visit     None      Visit Diagnoses     Acute suppurative otitis media of right ear without spontaneous rupture of tympanic membrane, recurrence not specified    -  Primary    Bronchitis        Relevant Medications    doxycycline (VIBRAMYCIN) 100 MG capsule    ondansetron ODT (ZOFRAN-ODT) disintegrating tablet 4 mg        Bronchitis--with significant nausea--will Rx zofran prn for the nausea. Follow up if all symptoms not settling 1 week.   AOM--start doxycycline--follow up if ear pain not settling 1 week.

## 2019-04-09 RX ORDER — METHYLPREDNISOLONE 4 MG/1
TABLET ORAL
Qty: 21 TABLET | Refills: 0 | Status: SHIPPED | OUTPATIENT
Start: 2019-04-09 | End: 2019-07-23 | Stop reason: SDUPTHER

## 2019-04-09 RX ORDER — DOXYCYCLINE HYCLATE 100 MG/1
100 CAPSULE ORAL 2 TIMES DAILY
Qty: 20 CAPSULE | Refills: 0 | Status: SHIPPED | OUTPATIENT
Start: 2019-04-09 | End: 2019-07-23 | Stop reason: SDUPTHER

## 2019-04-09 NOTE — PROGRESS NOTES
Extend abx and add steroid, not better and wheezing per [facility];  INR high, call to cardiology;  Recommend hold as abx driving up.

## 2019-07-02 RX ORDER — WARFARIN SODIUM 3 MG/1
TABLET ORAL
Qty: 30 TABLET | Refills: 0 | Status: SHIPPED | OUTPATIENT
Start: 2019-07-02 | End: 2019-08-02 | Stop reason: SDUPTHER

## 2019-07-23 ENCOUNTER — TELEPHONE (OUTPATIENT)
Dept: FAMILY MEDICINE CLINIC | Facility: CLINIC | Age: 79
End: 2019-07-23

## 2019-07-23 DIAGNOSIS — F41.9 ANXIETY: ICD-10-CM

## 2019-07-24 RX ORDER — LORAZEPAM 1 MG/1
TABLET ORAL
Qty: 60 TABLET | Refills: 0 | Status: SHIPPED | OUTPATIENT
Start: 2019-07-24 | End: 2019-08-20 | Stop reason: SDUPTHER

## 2019-07-26 ENCOUNTER — OFFICE VISIT (OUTPATIENT)
Dept: FAMILY MEDICINE CLINIC | Facility: CLINIC | Age: 79
End: 2019-07-26

## 2019-07-26 VITALS
OXYGEN SATURATION: 98 % | DIASTOLIC BLOOD PRESSURE: 82 MMHG | HEART RATE: 56 BPM | SYSTOLIC BLOOD PRESSURE: 134 MMHG | TEMPERATURE: 98.4 F

## 2019-07-26 DIAGNOSIS — M54.50 ACUTE MIDLINE LOW BACK PAIN WITHOUT SCIATICA: Primary | ICD-10-CM

## 2019-07-26 PROCEDURE — 99213 OFFICE O/P EST LOW 20 MIN: CPT | Performed by: NURSE PRACTITIONER

## 2019-07-26 RX ORDER — HYDROCODONE BITARTRATE AND ACETAMINOPHEN 5; 325 MG/1; MG/1
1 TABLET ORAL EVERY 4 HOURS PRN
Qty: 42 TABLET | Refills: 0 | Status: SHIPPED | OUTPATIENT
Start: 2019-07-26 | End: 2019-08-12 | Stop reason: SDUPTHER

## 2019-07-26 NOTE — PROGRESS NOTES
Subjective   Brooklyn Galvan is a 79 y.o. female who presents c/o pain in low back x 10 days. Unable to walk or stand without pain. No known injury.    History of Present Illness   Had similar flare of low back pain about 2 yrs ago. Settled with a few pain pills, finally went away. With any standing increased pain. Had a few leftover pain pills, has been helping. Has been taking HC, eases off for about an hour then returns with any activity. Has been using heat.   The following portions of the patient's history were reviewed and updated as appropriate: allergies, current medications, past family history, past medical history, past social history, past surgical history and problem list.    Review of Systems   Constitutional: Positive for activity change. Negative for chills and fever.   Gastrointestinal: Negative for constipation (no bowel or bladder changes) and diarrhea.   Genitourinary: Negative for urinary incontinence.   Musculoskeletal: Positive for arthralgias and back pain. Negative for gait problem.   Neurological: Negative for weakness and numbness.   Psychiatric/Behavioral: Negative.      /82   Pulse 56   Temp 98.4 °F (36.9 °C) (Oral)   SpO2 98%     Objective   Physical Exam   Constitutional: She appears well-developed and well-nourished. No distress.   Musculoskeletal:        Lumbar back: She exhibits decreased range of motion and pain (L234). She exhibits no tenderness and no spasm.   SLR neg chava, +SI tenderness,    Neurological: No sensory deficit. She exhibits normal muscle tone. Gait normal.   Reflex Scores:       Patellar reflexes are 2+ on the right side and 2+ on the left side.       Achilles reflexes are 2+ on the right side and 2+ on the left side.  Psychiatric: She has a normal mood and affect. Her speech is normal and behavior is normal.     Assessment/Plan   Problems Addressed this Visit        Nervous and Auditory    Low back pain - Primary    Relevant Medications     HYDROcodone-acetaminophen (NORCO) 5-325 MG per tablet        Reviewed chart notes, when flared in 2017, had PT to come to house to help address mobility limitations and pain. Reasonable to repeat as homebound except for dialysis appts. Taking 1/2 HC every 4-5 hours. Will need HC for about 1 week, as steroids contraindicated with glaucoma. Discussed increased risk of osteoporotic fracture with her CKD, too uncomfortable for imaging today. To reconsider if pain not rapidly settling down with PT. Discussed co prescribing with BZD and risk of overdose, cautious use, to separate doses. Agrees.     SANJIV Report:      As part of this patient's treatment plan, I am prescribing controlled substances. The patient has been made aware of appropriate use of such medications, including potential risk of somnolence, limited ability to drive and /or work safely, and potential for dependence or overdose. It has also been made clear that these medications are for use by this patient only, without concomitant use of alcohol or other substances unless prescribed.    SANJIV report has been reviewed by: ELIEL Damon on 07/28/19.  The report was scanned into the patient's chart.    Date of last SANJIV:  7/26/19

## 2019-07-29 RX ORDER — DOCUSATE SODIUM 100 MG
CAPSULE ORAL
Qty: 60 CAPSULE | Refills: 8 | Status: SHIPPED | OUTPATIENT
Start: 2019-07-29 | End: 2020-07-29

## 2019-08-05 RX ORDER — WARFARIN SODIUM 3 MG/1
TABLET ORAL
Qty: 30 TABLET | Refills: 0 | Status: SHIPPED | OUTPATIENT
Start: 2019-08-05 | End: 2019-09-16 | Stop reason: SDUPTHER

## 2019-08-12 DIAGNOSIS — M54.50 ACUTE MIDLINE LOW BACK PAIN WITHOUT SCIATICA: ICD-10-CM

## 2019-08-12 RX ORDER — HYDROCODONE BITARTRATE AND ACETAMINOPHEN 5; 325 MG/1; MG/1
1 TABLET ORAL EVERY 4 HOURS PRN
Qty: 42 TABLET | Refills: 0 | Status: SHIPPED | OUTPATIENT
Start: 2019-08-12 | End: 2019-08-20

## 2019-08-12 NOTE — TELEPHONE ENCOUNTER
Daughter notified that pain medication will be refilled. She will come by for lumbar xrays tomorrow.

## 2019-08-13 DIAGNOSIS — M54.50 ACUTE MIDLINE LOW BACK PAIN WITHOUT SCIATICA: Primary | ICD-10-CM

## 2019-08-13 PROCEDURE — 72110 X-RAY EXAM L-2 SPINE 4/>VWS: CPT | Performed by: NURSE PRACTITIONER

## 2019-08-15 ENCOUNTER — TELEPHONE (OUTPATIENT)
Dept: FAMILY MEDICINE CLINIC | Facility: CLINIC | Age: 79
End: 2019-08-15

## 2019-08-15 NOTE — PROGRESS NOTES
Possible new compression fracture. Recommend reevaluation in the office to determine if this is the cause of newer pain.

## 2019-08-20 ENCOUNTER — OFFICE VISIT (OUTPATIENT)
Dept: FAMILY MEDICINE CLINIC | Facility: CLINIC | Age: 79
End: 2019-08-20

## 2019-08-20 VITALS
SYSTOLIC BLOOD PRESSURE: 140 MMHG | WEIGHT: 210 LBS | DIASTOLIC BLOOD PRESSURE: 54 MMHG | BODY MASS INDEX: 34.41 KG/M2 | HEART RATE: 64 BPM | OXYGEN SATURATION: 93 % | TEMPERATURE: 98 F

## 2019-08-20 DIAGNOSIS — N18.6 END-STAGE RENAL DISEASE ON HEMODIALYSIS (HCC): ICD-10-CM

## 2019-08-20 DIAGNOSIS — M54.50 ACUTE BILATERAL LOW BACK PAIN WITHOUT SCIATICA: ICD-10-CM

## 2019-08-20 DIAGNOSIS — Z99.2 END-STAGE RENAL DISEASE ON HEMODIALYSIS (HCC): ICD-10-CM

## 2019-08-20 DIAGNOSIS — M48.50XA VERTEBRAL COMPRESSION FRACTURE (HCC): Primary | ICD-10-CM

## 2019-08-20 DIAGNOSIS — E03.8 OTHER SPECIFIED HYPOTHYROIDISM: ICD-10-CM

## 2019-08-20 DIAGNOSIS — N18.6 TYPE 2 DIABETES MELLITUS WITH CHRONIC KIDNEY DISEASE ON CHRONIC DIALYSIS, WITHOUT LONG-TERM CURRENT USE OF INSULIN (HCC): ICD-10-CM

## 2019-08-20 DIAGNOSIS — E78.5 DYSLIPIDEMIA: ICD-10-CM

## 2019-08-20 DIAGNOSIS — M54.6 ACUTE MIDLINE THORACIC BACK PAIN: ICD-10-CM

## 2019-08-20 DIAGNOSIS — I25.10 CORONARY ARTERY DISEASE INVOLVING NATIVE CORONARY ARTERY OF NATIVE HEART WITHOUT ANGINA PECTORIS: ICD-10-CM

## 2019-08-20 DIAGNOSIS — I10 BENIGN ESSENTIAL HYPERTENSION: ICD-10-CM

## 2019-08-20 DIAGNOSIS — E11.22 TYPE 2 DIABETES MELLITUS WITH CHRONIC KIDNEY DISEASE ON CHRONIC DIALYSIS, WITHOUT LONG-TERM CURRENT USE OF INSULIN (HCC): ICD-10-CM

## 2019-08-20 DIAGNOSIS — Z99.2 TYPE 2 DIABETES MELLITUS WITH CHRONIC KIDNEY DISEASE ON CHRONIC DIALYSIS, WITHOUT LONG-TERM CURRENT USE OF INSULIN (HCC): ICD-10-CM

## 2019-08-20 DIAGNOSIS — F41.9 ANXIETY: ICD-10-CM

## 2019-08-20 DIAGNOSIS — I48.0 PAROXYSMAL ATRIAL FIBRILLATION (HCC): ICD-10-CM

## 2019-08-20 PROCEDURE — 99214 OFFICE O/P EST MOD 30 MIN: CPT | Performed by: FAMILY MEDICINE

## 2019-08-20 RX ORDER — LORAZEPAM 1 MG/1
TABLET ORAL
Qty: 60 TABLET | Refills: 2 | Status: SHIPPED | OUTPATIENT
Start: 2019-08-20 | End: 2020-01-13 | Stop reason: SDUPTHER

## 2019-08-20 RX ORDER — HYDROCODONE BITARTRATE AND ACETAMINOPHEN 10; 325 MG/1; MG/1
1 TABLET ORAL EVERY 6 HOURS PRN
Qty: 45 TABLET | Refills: 0 | Status: SHIPPED | OUTPATIENT
Start: 2019-08-20 | End: 2019-08-28 | Stop reason: SDUPTHER

## 2019-08-20 NOTE — PROGRESS NOTES
Subjective   Brooklyn Galvan is a 79 y.o. female. Presents today for   Chief Complaint   Patient presents with   • Back Pain     follow up has been getting hydrocodone for pain and wants a refill for ativan   • Diabetes   • Hyperlipidemia   • Hypertension   • Coronary Artery Disease       Back Pain   This is a new problem. The current episode started 1 to 4 weeks ago. The problem occurs constantly. The problem has been waxing and waning since onset. The pain is present in the lumbar spine. The quality of the pain is described as aching. The pain does not radiate. The pain is moderate. The symptoms are aggravated by bending and position. Pertinent negatives include no bladder incontinence, bowel incontinence, chest pain, numbness, perianal numbness or tingling. (Pain severe and midline;  Relates had kyphoplasty in past for compression fx;  On plain film has age indeterminate compression fx T11.    Having hard time handling dialysis due to the pain.) Risk factors: ESRD on dialysis. She has tried analgesics (certain positions) for the symptoms. The treatment provided mild relief.   Diabetes   She presents for her follow-up diabetic visit. She has type 2 diabetes mellitus. Her disease course has been stable. Pertinent negatives for diabetes include no chest pain. Symptoms are stable. Diabetic complications include heart disease and nephropathy.   Hypertension   This is a chronic problem. The problem is unchanged. The problem is controlled. Pertinent negatives include no chest pain, orthopnea, palpitations, peripheral edema, PND or shortness of breath. There are no associated agents to hypertension. Risk factors for coronary artery disease include dyslipidemia, diabetes mellitus and post-menopausal state. Improvement on treatment: off medications. Hypertensive end-organ damage includes kidney disease and CAD/MI. Identifiable causes of hypertension include chronic renal disease.   Coronary Artery Disease   Presents for  follow-up visit. Pertinent negatives include no chest pain, chest pressure, chest tightness, leg swelling, palpitations or shortness of breath. The symptoms have been stable.   Hypothyroidism   This is a chronic problem. Pertinent negatives include no chest pain or numbness. Treatments tried: on levothyroxine, very over due for labs.   Has h xo fatrial fib, on chronic anticoagulation      Review of Systems   Respiratory: Negative for chest tightness and shortness of breath.    Cardiovascular: Negative for chest pain, palpitations, orthopnea, leg swelling and PND.   Gastrointestinal: Negative for bowel incontinence.   Genitourinary: Negative for bladder incontinence.   Musculoskeletal: Positive for back pain.   Neurological: Negative for tingling and numbness.       Patient Active Problem List   Diagnosis   • Vitamin D deficiency   • Anemia of chronic renal failure   • Benign essential hypertension   • Chronic kidney disease, stage V (CMS/HCC)   • Constipation   • Type 2 diabetes mellitus with chronic kidney disease on chronic dialysis, without long-term current use of insulin (CMS/HCC)   • Dyslipidemia   • Generalized anxiety disorder   • Low back pain   • Post-menopausal osteoporosis   • End-stage renal disease on hemodialysis (CMS/HCC)   • Paroxysmal atrial fibrillation (CMS/HCC)   • Coronary artery disease involving native coronary artery   • Chronic anticoagulation   • Non-rheumatic mitral regurgitation   • Pulmonary hypertension (CMS/HCC)   • ANDREA (obstructive sleep apnea)   • Nonischemic cardiomyopathy (CMS/HCC)   • Anxiety   • Adiposity   • Osteomyelitis of metatarsal (CMS/HCC)       Social History     Socioeconomic History   • Marital status:      Spouse name: Not on file   • Number of children: Not on file   • Years of education: Not on file   • Highest education level: Not on file   Tobacco Use   • Smoking status: Former Smoker   • Smokeless tobacco: Never Used   Substance and Sexual Activity   •  Alcohol use: Defer     Comment: denied       Allergies   Allergen Reactions   • Iodinated Diagnostic Agents Hives   • Penicillins Swelling   • Acetaminophen-Codeine    • Sulfa Antibiotics    • Acetaminophen Anxiety   • Promethazine Anxiety       Current Outpatient Medications on File Prior to Visit   Medication Sig Dispense Refill   • amiodarone (PACERONE) 200 MG tablet Take 100 mg by mouth Daily.     • atorvastatin (LIPITOR) 10 MG tablet Take 10 mg by mouth daily. ONE QD FOR CHOLESTEROL & HEART HEALTH     • b complex-C-folic acid 1 MG capsule Take 1 capsule by mouth daily.     • bimatoprost (LUMIGAN) 0.01 % ophthalmic drops Apply 1 drop to eye daily.     • levothyroxine (SYNTHROID, LEVOTHROID) 50 MCG tablet Take 50 mcg by mouth Daily.     • ondansetron ODT (ZOFRAN-ODT) 4 MG disintegrating tablet Take 1 tablet by mouth Every 8 (Eight) Hours As Needed for Nausea or Vomiting. 21 tablet 0   • PARoxetine (PAXIL) 40 MG tablet TAKE ONE AND ONE-HALF TABLET BY MOUTH DAILY 45 tablet 6   • sevelamer (RENVELA) 800 MG tablet Take 800 mg by mouth 3 (Three) Times a Day With Meals.     • sodium bicarbonate 650 MG tablet Take 650 mg by mouth 4 (Four) Times a Day.     • STOOL SOFTENER 100 MG capsule TAKE ONE CAPSULE BY MOUTH TWICE A DAY 60 capsule 8   • timolol (TIMOPTIC) 0.25 % ophthalmic solution 1 drop 2 (Two) Times a Day.     • vitamin D (ERGOCALCIFEROL) 31154 UNITS capsule capsule Take 50,000 Units by mouth 1 (One) Time Per Week.     • warfarin (COUMADIN) 3 MG tablet TAKE ONE TABLET BY MOUTH DAILY AS DIRECTED BY PRESCRIBER 30 tablet 0   • warfarin (COUMADIN) 5 MG tablet      • [DISCONTINUED] HYDROcodone-acetaminophen (NORCO) 5-325 MG per tablet Take 1 tablet by mouth Every 4 (Four) Hours As Needed for Severe Pain . 42 tablet 0   • [DISCONTINUED] LORazepam (ATIVAN) 1 MG tablet 1/2-1 po bid prn anxiety 60 tablet 0     No current facility-administered medications on file prior to visit.        Objective   Vitals:    08/20/19 1348    BP: 140/54   Pulse: 64   Temp: 98 °F (36.7 °C)   SpO2: 93%   Weight: 95.3 kg (210 lb)  Comment: per pt jm       Physical Exam   Constitutional: She appears well-developed and well-nourished. She appears distressed (appears in acute pain).   HENT:   Head: Normocephalic and atraumatic.   Neck: Neck supple. No JVD present. No thyromegaly present.   Cardiovascular: Normal rate, regular rhythm and normal heart sounds. Exam reveals no gallop and no friction rub.   No murmur heard.  Pulmonary/Chest: Effort normal and breath sounds normal. No respiratory distress. She has no wheezes. She has no rales.   Abdominal: Soft. Bowel sounds are normal. She exhibits no distension. There is no tenderness. There is no rebound and no guarding.   Musculoskeletal: She exhibits no edema.        Thoracic back: She exhibits tenderness and bony tenderness.        Lumbar back: She exhibits tenderness and bony tenderness.   Neurological: She is alert.   Skin: Skin is warm and dry.   Psychiatric: She has a normal mood and affect. Her behavior is normal.   Nursing note and vitals reviewed.      Assessment/Plan   Brooklyn was seen today for back pain, diabetes, hyperlipidemia, hypertension and coronary artery disease.    Diagnoses and all orders for this visit:    Vertebral compression fracture (CMS/[facility])  -     CT thoracic spine wo contrast; Future  -     CT lumbar spine wo contrast; Future  -     Ambulatory Referral to Orthopedic Surgery  -     HYDROcodone-acetaminophen (NORCO)  MG per tablet; Take 1 tablet by mouth Every 6 (Six) Hours As Needed for Moderate Pain  or Severe Pain .    Type 2 diabetes mellitus with chronic kidney disease on chronic dialysis, without long-term current use of insulin (CMS/HCC)  -     Cancel: POCT microalbumin  -     Comprehensive Metabolic Panel  -     Hemoglobin A1c  -     Lipid Panel    Coronary artery disease involving native coronary artery of native heart without angina pectoris  -     Comprehensive  Metabolic Panel  -     Hemoglobin A1c  -     Lipid Panel    Benign essential hypertension  -     Comprehensive Metabolic Panel  -     Hemoglobin A1c  -     Lipid Panel    Paroxysmal atrial fibrillation (CMS/HCC)    Dyslipidemia    Acute bilateral low back pain without sciatica  -     Ambulatory Referral to Orthopedic Surgery  -     HYDROcodone-acetaminophen (NORCO)  MG per tablet; Take 1 tablet by mouth Every 6 (Six) Hours As Needed for Moderate Pain  or Severe Pain .    Acute midline thoracic back pain  -     Ambulatory Referral to Orthopedic Surgery  -     HYDROcodone-acetaminophen (NORCO)  MG per tablet; Take 1 tablet by mouth Every 6 (Six) Hours As Needed for Moderate Pain  or Severe Pain .    Anxiety  -     LORazepam (ATIVAN) 1 MG tablet; 1/2-1 po bid prn anxiety    End-stage renal disease on hemodialysis (CMS/HCC)    Other specified hypothyroidism  -     TSH    -refilled ativan;    -hypothyroidism - continue medication, recheck thyroid labs.  -HLD - continue statin, recheck lipids.  -cad RF reduction, Lipid, BP and BS control.  -over due for lipids, a1c;  -esrd - no nephrotoxins  -very severe back pain;  Ok increase HC to 10mg;  Ice as directed;  On xray age inderminate T11 compression fx;  Given severity of pain, midline and bony pain on exam, suspect compression fx;  Will image back and refer to spinal;  Given hx, candidate for kyphoplasty is compression fx?;      Reviewed SANJIV         -Follow up: 3 months and prn

## 2019-08-21 LAB
ALBUMIN SERPL-MCNC: 3.6 G/DL (ref 3.5–4.8)
ALBUMIN/GLOB SERPL: 1.4 {RATIO} (ref 1.2–2.2)
ALP SERPL-CCNC: 163 IU/L (ref 39–117)
ALT SERPL-CCNC: 26 IU/L (ref 0–32)
AST SERPL-CCNC: 30 IU/L (ref 0–40)
BILIRUB SERPL-MCNC: 0.5 MG/DL (ref 0–1.2)
BUN SERPL-MCNC: 27 MG/DL (ref 8–27)
BUN/CREAT SERPL: 5 (ref 12–28)
CALCIUM SERPL-MCNC: 8.6 MG/DL (ref 8.7–10.3)
CHLORIDE SERPL-SCNC: 99 MMOL/L (ref 96–106)
CHOLEST SERPL-MCNC: 132 MG/DL (ref 100–199)
CO2 SERPL-SCNC: 26 MMOL/L (ref 20–29)
CREAT SERPL-MCNC: 5.66 MG/DL (ref 0.57–1)
GLOBULIN SER CALC-MCNC: 2.5 G/DL (ref 1.5–4.5)
GLUCOSE SERPL-MCNC: 182 MG/DL (ref 65–99)
HBA1C MFR BLD: 5.3 % (ref 4.8–5.6)
HDLC SERPL-MCNC: 63 MG/DL
LDLC SERPL CALC-MCNC: 47 MG/DL (ref 0–99)
POTASSIUM SERPL-SCNC: 4.7 MMOL/L (ref 3.5–5.2)
PROT SERPL-MCNC: 6.1 G/DL (ref 6–8.5)
SODIUM SERPL-SCNC: 142 MMOL/L (ref 134–144)
TRIGL SERPL-MCNC: 110 MG/DL (ref 0–149)
TSH SERPL DL<=0.005 MIU/L-ACNC: 4.57 UIU/ML (ref 0.45–4.5)
VLDLC SERPL CALC-MCNC: 22 MG/DL (ref 5–40)

## 2019-08-22 NOTE — PROGRESS NOTES
Call and mail copy of results to patient.  Thyroid slightly underactive, any missed doses?  Cholesterol and blood sugar well controlled

## 2019-08-25 ENCOUNTER — TELEPHONE (OUTPATIENT)
Dept: FAMILY MEDICINE CLINIC | Facility: CLINIC | Age: 79
End: 2019-08-25

## 2019-08-25 NOTE — TELEPHONE ENCOUNTER
Ok will hold changes for now and recheck in couple months.  Also on CT of back looks like new compression fx at T11, see spinal as directed.  I would also suggest see PT to help mobilize.  RRJ    ----- Message from Floridalma Garcia MA sent at 8/22/2019  4:42 PM EDT -----  Per pt, she is not sure if she has missed any levothyroxine doses.     She is aware of her results.

## 2019-08-28 DIAGNOSIS — M48.50XA VERTEBRAL COMPRESSION FRACTURE (HCC): ICD-10-CM

## 2019-08-28 DIAGNOSIS — M54.6 ACUTE MIDLINE THORACIC BACK PAIN: ICD-10-CM

## 2019-08-28 DIAGNOSIS — M54.50 ACUTE BILATERAL LOW BACK PAIN WITHOUT SCIATICA: ICD-10-CM

## 2019-08-28 RX ORDER — HYDROCODONE BITARTRATE AND ACETAMINOPHEN 10; 325 MG/1; MG/1
1 TABLET ORAL EVERY 4 HOURS PRN
Qty: 60 TABLET | Refills: 0 | Status: SHIPPED | OUTPATIENT
Start: 2019-08-28 | End: 2019-09-16 | Stop reason: SDUPTHER

## 2019-09-12 ENCOUNTER — TELEPHONE (OUTPATIENT)
Dept: FAMILY MEDICINE CLINIC | Facility: CLINIC | Age: 79
End: 2019-09-12

## 2019-09-16 DIAGNOSIS — M48.50XA VERTEBRAL COMPRESSION FRACTURE (HCC): ICD-10-CM

## 2019-09-16 DIAGNOSIS — M54.50 ACUTE BILATERAL LOW BACK PAIN WITHOUT SCIATICA: ICD-10-CM

## 2019-09-16 DIAGNOSIS — M54.6 ACUTE MIDLINE THORACIC BACK PAIN: ICD-10-CM

## 2019-09-16 RX ORDER — HYDROCODONE BITARTRATE AND ACETAMINOPHEN 10; 325 MG/1; MG/1
1 TABLET ORAL EVERY 4 HOURS PRN
Qty: 90 TABLET | Refills: 0 | Status: SHIPPED | OUTPATIENT
Start: 2019-09-16 | End: 2019-10-17 | Stop reason: SDUPTHER

## 2019-09-16 RX ORDER — WARFARIN SODIUM 3 MG/1
TABLET ORAL
Qty: 30 TABLET | Refills: 5 | Status: SHIPPED | OUTPATIENT
Start: 2019-09-16 | End: 2020-03-02

## 2019-10-15 ENCOUNTER — TELEPHONE (OUTPATIENT)
Dept: FAMILY MEDICINE CLINIC | Facility: CLINIC | Age: 79
End: 2019-10-15

## 2019-10-15 DIAGNOSIS — M54.6 ACUTE MIDLINE THORACIC BACK PAIN: ICD-10-CM

## 2019-10-15 DIAGNOSIS — M48.50XA VERTEBRAL COMPRESSION FRACTURE (HCC): ICD-10-CM

## 2019-10-15 DIAGNOSIS — M54.50 ACUTE BILATERAL LOW BACK PAIN WITHOUT SCIATICA: ICD-10-CM

## 2019-10-15 RX ORDER — HYDROCODONE BITARTRATE AND ACETAMINOPHEN 10; 325 MG/1; MG/1
1 TABLET ORAL EVERY 4 HOURS PRN
Qty: 90 TABLET | Refills: 0 | Status: CANCELLED | OUTPATIENT
Start: 2019-10-15

## 2019-10-17 DIAGNOSIS — M54.6 ACUTE MIDLINE THORACIC BACK PAIN: ICD-10-CM

## 2019-10-17 DIAGNOSIS — M48.50XA VERTEBRAL COMPRESSION FRACTURE (HCC): ICD-10-CM

## 2019-10-17 DIAGNOSIS — M54.50 ACUTE BILATERAL LOW BACK PAIN WITHOUT SCIATICA: ICD-10-CM

## 2019-10-17 RX ORDER — HYDROCODONE BITARTRATE AND ACETAMINOPHEN 10; 325 MG/1; MG/1
1 TABLET ORAL EVERY 4 HOURS PRN
Qty: 90 TABLET | Refills: 0 | Status: SHIPPED | OUTPATIENT
Start: 2019-10-17

## 2019-12-03 DIAGNOSIS — F41.9 ANXIETY: ICD-10-CM

## 2019-12-04 RX ORDER — PAROXETINE HYDROCHLORIDE 40 MG/1
TABLET, FILM COATED ORAL
Qty: 45 TABLET | Refills: 5 | Status: SHIPPED | OUTPATIENT
Start: 2019-12-04 | End: 2020-04-28

## 2020-01-13 ENCOUNTER — TELEPHONE (OUTPATIENT)
Dept: FAMILY MEDICINE CLINIC | Facility: CLINIC | Age: 80
End: 2020-01-13

## 2020-01-13 DIAGNOSIS — F41.9 ANXIETY: ICD-10-CM

## 2020-01-13 RX ORDER — LORAZEPAM 1 MG/1
TABLET ORAL
Qty: 60 TABLET | Refills: 1 | Status: SHIPPED | OUTPATIENT
Start: 2020-01-13 | End: 2020-03-19 | Stop reason: SDUPTHER

## 2020-03-02 RX ORDER — WARFARIN SODIUM 3 MG/1
TABLET ORAL
Qty: 60 TABLET | Refills: 4 | Status: SHIPPED | OUTPATIENT
Start: 2020-03-02 | End: 2020-07-31

## 2020-03-19 DIAGNOSIS — F41.9 ANXIETY: ICD-10-CM

## 2020-03-19 RX ORDER — LORAZEPAM 1 MG/1
TABLET ORAL
Qty: 60 TABLET | Refills: 1 | Status: SHIPPED | OUTPATIENT
Start: 2020-03-19 | End: 2020-05-05 | Stop reason: SDUPTHER

## 2020-04-28 DIAGNOSIS — F41.9 ANXIETY: ICD-10-CM

## 2020-04-28 RX ORDER — PAROXETINE HYDROCHLORIDE 40 MG/1
TABLET, FILM COATED ORAL
Qty: 90 TABLET | Refills: 5 | Status: SHIPPED | OUTPATIENT
Start: 2020-04-28 | End: 2021-04-12

## 2020-05-04 ENCOUNTER — TELEPHONE (OUTPATIENT)
Dept: FAMILY MEDICINE CLINIC | Facility: CLINIC | Age: 80
End: 2020-05-04

## 2020-05-04 NOTE — TELEPHONE ENCOUNTER
PATIENT'S DAUGHTER CALLED TO MAKE TELEPHONE VISIT FOR MED REFILLS    SHE DOES NOT HAVE AN EMAIL ADDRESS. ADVISED DAUGHTER TO GET AN EMAIL ADDRESS SIGNED UP WITH MY CHART.   SHE HAS APPOINTMENT FOR OFFICE VISIT 5/5/2020 AT 2:45 THAT WOULD BE A GOOD TIME TO MAKE TELEPHONE APPT.    LORazepam (ATIVAN) 1 MG tablet  IS WHAT SHE IS IN NEED OF    [facility] [address] [address] [place] [facility] - [phone]  - [phone]   [phone]    PLEASE CALL [phone] T0 SET UP APPOINTMENT

## 2020-05-05 ENCOUNTER — OFFICE VISIT (OUTPATIENT)
Dept: FAMILY MEDICINE CLINIC | Facility: CLINIC | Age: 80
End: 2020-05-05

## 2020-05-05 ENCOUNTER — TELEPHONE (OUTPATIENT)
Dept: FAMILY MEDICINE CLINIC | Facility: CLINIC | Age: 80
End: 2020-05-05

## 2020-05-05 DIAGNOSIS — F41.9 ANXIETY: Primary | ICD-10-CM

## 2020-05-05 DIAGNOSIS — K64.4 EXTERNAL HEMORRHOID: ICD-10-CM

## 2020-05-05 DIAGNOSIS — G89.29 CHRONIC BILATERAL LOW BACK PAIN WITHOUT SCIATICA: ICD-10-CM

## 2020-05-05 DIAGNOSIS — M54.50 CHRONIC BILATERAL LOW BACK PAIN WITHOUT SCIATICA: ICD-10-CM

## 2020-05-05 PROCEDURE — 99442 PR PHYS/QHP TELEPHONE EVALUATION 11-20 MIN: CPT | Performed by: FAMILY MEDICINE

## 2020-05-05 RX ORDER — BACLOFEN 10 MG/1
TABLET ORAL
Qty: 90 TABLET | Refills: 1 | Status: SHIPPED | OUTPATIENT
Start: 2020-05-05 | End: 2021-04-28

## 2020-05-05 RX ORDER — LORAZEPAM 1 MG/1
TABLET ORAL
Qty: 60 TABLET | Refills: 2 | Status: SHIPPED | OUTPATIENT
Start: 2020-05-05 | End: 2020-06-05 | Stop reason: SDUPTHER

## 2020-05-05 NOTE — TELEPHONE ENCOUNTER
[facility] pharmacy is calling for clarification on medication directions for the following    baclofen (LIORESAL) 10 MG tablet    Please advise.    Pharmacy [phone]

## 2020-06-05 ENCOUNTER — OFFICE VISIT (OUTPATIENT)
Dept: FAMILY MEDICINE CLINIC | Facility: CLINIC | Age: 80
End: 2020-06-05

## 2020-06-05 VITALS
SYSTOLIC BLOOD PRESSURE: 112 MMHG | DIASTOLIC BLOOD PRESSURE: 60 MMHG | HEART RATE: 63 BPM | OXYGEN SATURATION: 100 % | BODY MASS INDEX: 31.49 KG/M2 | HEIGHT: 65 IN | WEIGHT: 189 LBS

## 2020-06-05 DIAGNOSIS — Z00.00 MEDICARE ANNUAL WELLNESS VISIT, SUBSEQUENT: Primary | ICD-10-CM

## 2020-06-05 DIAGNOSIS — Z79.01 CHRONIC ANTICOAGULATION: ICD-10-CM

## 2020-06-05 DIAGNOSIS — N18.6 END-STAGE RENAL DISEASE ON HEMODIALYSIS (HCC): ICD-10-CM

## 2020-06-05 DIAGNOSIS — E11.22 TYPE 2 DIABETES MELLITUS WITH CHRONIC KIDNEY DISEASE ON CHRONIC DIALYSIS, WITHOUT LONG-TERM CURRENT USE OF INSULIN (HCC): ICD-10-CM

## 2020-06-05 DIAGNOSIS — R29.898 WEAKNESS OF BOTH LOWER EXTREMITIES: ICD-10-CM

## 2020-06-05 DIAGNOSIS — N18.6 TYPE 2 DIABETES MELLITUS WITH CHRONIC KIDNEY DISEASE ON CHRONIC DIALYSIS, WITHOUT LONG-TERM CURRENT USE OF INSULIN (HCC): ICD-10-CM

## 2020-06-05 DIAGNOSIS — I27.20 PULMONARY HYPERTENSION (HCC): ICD-10-CM

## 2020-06-05 DIAGNOSIS — L85.8 KERATOACANTHOMA OF LOWER LEG: ICD-10-CM

## 2020-06-05 DIAGNOSIS — M86.9 OSTEOMYELITIS OF METATARSAL (HCC): ICD-10-CM

## 2020-06-05 DIAGNOSIS — G47.33 OSA (OBSTRUCTIVE SLEEP APNEA): ICD-10-CM

## 2020-06-05 DIAGNOSIS — Z99.2 END-STAGE RENAL DISEASE ON HEMODIALYSIS (HCC): ICD-10-CM

## 2020-06-05 DIAGNOSIS — Z99.2 TYPE 2 DIABETES MELLITUS WITH CHRONIC KIDNEY DISEASE ON CHRONIC DIALYSIS, WITHOUT LONG-TERM CURRENT USE OF INSULIN (HCC): ICD-10-CM

## 2020-06-05 DIAGNOSIS — E78.5 DYSLIPIDEMIA: ICD-10-CM

## 2020-06-05 DIAGNOSIS — I48.0 PAROXYSMAL ATRIAL FIBRILLATION (HCC): ICD-10-CM

## 2020-06-05 DIAGNOSIS — E03.8 OTHER SPECIFIED HYPOTHYROIDISM: ICD-10-CM

## 2020-06-05 DIAGNOSIS — I25.10 CORONARY ARTERY DISEASE INVOLVING NATIVE CORONARY ARTERY OF NATIVE HEART WITHOUT ANGINA PECTORIS: ICD-10-CM

## 2020-06-05 DIAGNOSIS — I10 BENIGN ESSENTIAL HYPERTENSION: ICD-10-CM

## 2020-06-05 DIAGNOSIS — F41.9 ANXIETY: ICD-10-CM

## 2020-06-05 PROBLEM — I42.8 NONISCHEMIC CARDIOMYOPATHY (HCC): Status: RESOLVED | Noted: 2017-03-13 | Resolved: 2020-06-05

## 2020-06-05 PROCEDURE — 99214 OFFICE O/P EST MOD 30 MIN: CPT | Performed by: FAMILY MEDICINE

## 2020-06-05 PROCEDURE — G0439 PPPS, SUBSEQ VISIT: HCPCS | Performed by: FAMILY MEDICINE

## 2020-06-05 RX ORDER — LORAZEPAM 1 MG/1
TABLET ORAL
Qty: 60 TABLET | Refills: 2 | Status: SHIPPED | OUTPATIENT
Start: 2020-06-05 | End: 2020-11-30

## 2020-06-05 RX ORDER — MIDODRINE HYDROCHLORIDE 5 MG/1
1 TABLET ORAL AS NEEDED
COMMUNITY
Start: 2020-03-16

## 2020-06-05 RX ORDER — DORZOLAMIDE HYDROCHLORIDE AND TIMOLOL MALEATE 20; 5 MG/ML; MG/ML
SOLUTION/ DROPS OPHTHALMIC
COMMUNITY
Start: 2020-03-12

## 2020-06-05 RX ORDER — CARVEDILOL 12.5 MG/1
1 TABLET ORAL EVERY 12 HOURS
COMMUNITY
End: 2021-04-28

## 2020-06-05 NOTE — PROGRESS NOTES
QUICK REFERENCE INFORMATION:  The ABCs of the Annual Wellness Visit    Subsequent Medicare Wellness Visit    HEALTH RISK ASSESSMENT    1940    Recent Hospitalizations:  Recently treated at the following:  Other: [facility] for AVF revision, had oxygen requirements post-op so admitted overnight, did fine and released..        Current Medical Providers:  Patient Care Team:  Michael Hernadez DO as PCP - General (Family Medicine)        Smoking Status:  Social History     Tobacco Use   Smoking Status Former Smoker   • Packs/day: 0.10   • Types: Cigarettes   • Start date: 1953   • Last attempt to quit: 1981   • Years since quittin.6   Smokeless Tobacco Never Used       Alcohol Consumption:  Social History     Substance and Sexual Activity   Alcohol Use Defer    Comment: denied       Depression Screen:   PHQ-2/PHQ-9 Depression Screening 2020   Little interest or pleasure in doing things 0   Feeling down, depressed, or hopeless 0   Trouble falling or staying asleep, or sleeping too much 0   Feeling tired or having little energy 0   Poor appetite or overeating 0   Feeling bad about yourself - or that you are a failure or have let yourself or your family down 0   Trouble concentrating on things, such as reading the newspaper or watching television 0   Moving or speaking so slowly that other people could have noticed. Or the opposite - being so fidgety or restless that you have been moving around a lot more than usual 0   Thoughts that you would be better off dead, or of hurting yourself in some way 0   Total Score 0   If you checked off any problems, how difficult have these problems made it for you to do your work, take care of things at home, or get along with other people? Not difficult at all       Health Habits and Functional and Cognitive Screening:  Functional & Cognitive Status 2020   Do you have difficulty preparing food and eating? No   Do you have difficulty bathing yourself, getting  dressed or grooming yourself? No   Do you have difficulty using the toilet? No   Do you have difficulty moving around from place to place? No   Do you have trouble with steps or getting out of a bed or a chair? No   Current Diet Well Balanced Diet   Dental Exam Not up to date   Eye Exam Up to date   Exercise (times per week) 3 times per week   Current Exercise Activities Include No Regular Exercise   Do you need help using the phone?  No   Are you deaf or do you have serious difficulty hearing?  No   Do you need help with transportation? Yes   Do you need help shopping? Yes   Do you need help preparing meals?  No   Do you need help with housework?  No   Do you need help with laundry? No   Do you need help taking your medications? No   Do you need help managing money? No   Do you ever drive or ride in a car without wearing a seat belt? No   Have you felt unusual stress, anger or loneliness in the last month? No   Who do you live with? Spouse   If you need help, do you have trouble finding someone available to you? No   Have you been bothered in the last four weeks by sexual problems? No   Do you have difficulty concentrating, remembering or making decisions? No           Does the patient have evidence of cognitive impairment? No    Aspirin use counseling: Does not need ASA (and currently is not on it)      Recent Lab Results:  CMP:  Lab Results   Component Value Date     (H) 07/22/2020    BUN 43 (H) 07/22/2020    CREATININE 4.9 (H) 07/22/2020    EGFRIFNONA 7 (L) 08/20/2019    EGFRIFAFRI 8 (L) 08/20/2019    BCR 8.8 07/22/2020     (L) 07/22/2020    K 4.6 07/22/2020    CO2 27 07/22/2020    CALCIUM 8.3 (L) 07/22/2020    PROTENTOTREF 6.1 08/20/2019    ALBUMIN 3.4 (L) 07/19/2020    LABGLOBREF 2.5 08/20/2019    LABIL2 1.2 07/19/2020    BILITOT 1.2 07/19/2020    ALKPHOS 158 (H) 07/19/2020    AST 36 07/19/2020    ALT 70 (H) 07/19/2020     Lipid Panel:  Lab Results   Component Value Date    TRIG 136 06/24/2020     HDL 41 (L) 06/24/2020    VLDL 27 06/24/2020     HbA1c:  Lab Results   Component Value Date    HGBA1C 5.6 06/23/2020       Visual Acuity:  No exam data present    Age-appropriate Screening Schedule:  Refer to the list below for future screening recommendations based on patient's age, sex and/or medical conditions. Orders for these recommended tests are listed in the plan section. The patient has been provided with a written plan.    Health Maintenance   Topic Date Due   • DIABETIC EYE EXAM  08/20/2017   • DXA SCAN  03/13/2019   • MAMMOGRAM  07/20/2020   • URINE MICROALBUMIN  08/20/2020   • INFLUENZA VACCINE  08/01/2020   • HEMOGLOBIN A1C  12/23/2020   • LIPID PANEL  06/24/2021   • COLONOSCOPY  12/27/2026   • TDAP/TD VACCINES (3 - Td) 01/14/2029   • PNEUMOCOCCAL VACCINE (65+ HIGH RISK)  Completed        Subjective   History of Present Illness    Brooklyn Galvan is a 80 y.o. female who presents for an Subsequent Wellness Visit.    The following portions of the patient's history were reviewed and updated as appropriate: allergies, current medications, past family history, past medical history, past social history, past surgical history and problem list.    Outpatient Medications Prior to Visit   Medication Sig Dispense Refill   • amiodarone (PACERONE) 200 MG tablet Take 100 mg by mouth Daily.     • atorvastatin (LIPITOR) 10 MG tablet Take 10 mg by mouth daily. ONE QD FOR CHOLESTEROL & HEART HEALTH     • b complex-C-folic acid 1 MG capsule Take 1 capsule by mouth daily.     • baclofen (LIORESAL) 10 MG tablet 1/2 to daily prn back pain after dialysis 90 tablet 1   • bimatoprost (LUMIGAN) 0.01 % ophthalmic drops Apply 1 drop to eye daily.     • carvedilol (COREG) 12.5 MG tablet Take 1 tablet by mouth Every 12 (Twelve) Hours.     • dorzolamide-timolol (COSOPT) 22.3-6.8 MG/ML ophthalmic solution      • HYDROcodone-acetaminophen (NORCO)  MG per tablet Take 1 tablet by mouth Every 4 (Four) Hours As Needed for Moderate  Pain  or Severe Pain . 90 tablet 0   • levothyroxine (SYNTHROID, LEVOTHROID) 50 MCG tablet Take 50 mcg by mouth Daily.     • midodrine (PROAMATINE) 5 MG tablet Take 1 tablet by mouth As Needed.     • PARoxetine (PAXIL) 40 MG tablet TAKE ONE AND ONE-HALF (1 & 1/2) TABLET BY MOUTH DAILY 90 tablet 5   • sevelamer (RENVELA) 800 MG tablet Take 800 mg by mouth 3 (Three) Times a Day With Meals.     • sodium bicarbonate 650 MG tablet Take 650 mg by mouth 4 (Four) Times a Day.     • timolol (TIMOPTIC) 0.25 % ophthalmic solution 1 drop 2 (Two) Times a Day.     • vitamin D (ERGOCALCIFEROL) 89314 UNITS capsule capsule Take 50,000 Units by mouth 1 (One) Time Per Week.     • LORazepam (ATIVAN) 1 MG tablet 1/2-1 po bid prn anxiety 60 tablet 2   • STOOL SOFTENER 100 MG capsule TAKE ONE CAPSULE BY MOUTH TWICE A DAY 60 capsule 8   • warfarin (COUMADIN) 3 MG tablet TAKE ONE TABLET BY MOUTH DAILY AS DIRECTED BY PRESCRIBER 60 tablet 4   • warfarin (COUMADIN) 5 MG tablet      • ondansetron ODT (ZOFRAN-ODT) 4 MG disintegrating tablet Take 1 tablet by mouth Every 8 (Eight) Hours As Needed for Nausea or Vomiting. 21 tablet 0   • hydrocortisone 2.5 % ointment Wipe bottom twice daily with pea sized amount 60 g 2     No facility-administered medications prior to visit.        Patient Active Problem List   Diagnosis   • Vitamin D deficiency   • Anemia of chronic renal failure   • Benign essential hypertension   • Chronic kidney disease, stage V (CMS/[facility])   • Constipation   • Type 2 diabetes mellitus with chronic kidney disease on chronic dialysis, without long-term current use of insulin (CMS/[facility])   • Dyslipidemia   • Generalized anxiety disorder   • Low back pain   • Post-menopausal osteoporosis   • End-stage renal disease on hemodialysis (CMS/[facility])   • Paroxysmal atrial fibrillation (CMS/[facility])   • Coronary artery disease involving native coronary artery   • Chronic anticoagulation   • Non-rheumatic mitral regurgitation   • Pulmonary  hypertension (CMS/[facility])   • ANDREA (obstructive sleep apnea)   • Anxiety   • Adiposity   • Osteomyelitis of metatarsal (CMS/[facility])   • ESRD (end stage renal disease) (CMS/[facility])   • Diabetic ulcer of toe of right foot (CMS/[facility])   • Disorder of phosphorus metabolism   • Followed by palliative care service   • Generalized weakness   • Healthcare-associated pneumonia   • MRSA bacteremia   • Primary hyperparathyroidism (CMS/[facility])   • Sepsis (CMS/[facility])   • Supratherapeutic INR       Advance Care Planning:  ACP discussion was held with the patient during this visit. Patient does not have an advance directive, information provided.    Identification of Risk Factors:  Risk factors include: Fall Risk  Obesity/Overweight .    Review of Systems   Constitutional: Positive for malaise/fatigue. Negative for fever.   Respiratory: Negative for chest tightness and shortness of breath.    Cardiovascular: Negative for chest pain, palpitations, orthopnea, leg swelling and PND.   Gastrointestinal: Negative for abdominal pain.   Skin: Positive for rash.   Neurological: Positive for weakness. Negative for dizziness.       Compared to one year ago, the patient feels her physical health is the same.  Compared to one year ago, the patient feels her mental health is the same.    Objective     Physical Exam   Constitutional: She appears well-developed and well-nourished.   HENT:   Head: Normocephalic and atraumatic.   Neck: Neck supple. No JVD present. No thyromegaly present.   Cardiovascular: Normal rate, regular rhythm and normal heart sounds. Exam reveals no gallop and no friction rub.   No murmur heard.  Pulmonary/Chest: Effort normal and breath sounds normal. No respiratory distress. She has no wheezes. She has no rales.   Abdominal: Soft. Bowel sounds are normal. She exhibits no distension. There is no tenderness. There is no rebound and no guarding.   Musculoskeletal: She exhibits no edema.        Right hip: She exhibits decreased strength.        " Left hip: She exhibits decreased strength.   Neurological: She is alert. She exhibits abnormal muscle tone. Gait abnormal.   In transport chair, needs assistance to stand.   Skin: Skin is warm and dry.   Right mid-thigh skin nodule with cetnral crusting, looks c/w keratoacanthoma.   Psychiatric: She has a normal mood and affect. Her behavior is normal.   Nursing note and vitals reviewed.      Vitals:    06/05/20 0844   BP: 112/60   BP Location: Left arm   Patient Position: Sitting   Cuff Size: Adult   Pulse: 63   SpO2: 100%   Weight: 85.7 kg (189 lb)   Height: 165.1 cm (65\")   PainSc:   6   PainLoc: Arm       Patient's Body mass index is 31.45 kg/m². BMI is above normal parameters. Recommendations include: educational material.      Assessment/Plan   Patient Self-Management and Personalized Health Advice  The patient has been provided with information about: diet and exercise and preventive services including:   · Annual Wellness Visit (AWV).    Visit Diagnoses:    ICD-10-CM ICD-9-CM   1. Medicare annual wellness visit, subsequent Z00.00 V70.0   2. Benign essential hypertension I10 401.1   3. Coronary artery disease involving native coronary artery of native heart without angina pectoris I25.10 414.01   4. Paroxysmal atrial fibrillation (CMS/[facility]) I48.0 427.31   5. Pulmonary hypertension (CMS/[facility]) I27.20 416.8   6. ANDREA (obstructive sleep apnea) G47.33 327.23   7. Type 2 diabetes mellitus with chronic kidney disease on chronic dialysis, without long-term current use of insulin (CMS/[facility]) E11.22 250.40    N18.6 585.9    Z99.2 V45.11   8. Osteomyelitis of metatarsal (CMS/[facility]) M86.9 730.27   9. End-stage renal disease on hemodialysis (CMS/[facility]) N18.6 585.6    Z99.2 V45.11   10. Chronic anticoagulation Z79.01 V58.61   11. Dyslipidemia E78.5 272.4   12. Keratoacanthoma of lower leg L85.8 238.2   13. Other specified hypothyroidism E03.8 244.8   14. Anxiety F41.9 300.00   15. Weakness of both lower extremities R29.898 729.89 "       Orders Placed This Encounter   Procedures   • Lipid Panel     Order Specific Question:   [facility] Has the patient fasted?     Answer:   No   • Hemoglobin A1c     Order Specific Question:   [facility] Has the patient fasted?     Answer:   No   • TSH     Order Specific Question:   [facility] Has the patient fasted?     Answer:   No   • Ambulatory Referral to Dermatology     Referral Priority:   Routine     Referral Type:   Consultation     Referral Reason:   Specialty Services Required     Referred to Provider:   Tevin Montelongo MD     Requested Specialty:   Dermatology     Number of Visits Requested:   1       Outpatient Encounter Medications as of 6/5/2020   Medication Sig Dispense Refill   • amiodarone (PACERONE) 200 MG tablet Take 100 mg by mouth Daily.     • atorvastatin (LIPITOR) 10 MG tablet Take 10 mg by mouth daily. ONE QD FOR CHOLESTEROL & HEART HEALTH     • b complex-C-folic acid 1 MG capsule Take 1 capsule by mouth daily.     • baclofen (LIORESAL) 10 MG tablet 1/2 to daily prn back pain after dialysis 90 tablet 1   • bimatoprost (LUMIGAN) 0.01 % ophthalmic drops Apply 1 drop to eye daily.     • carvedilol (COREG) 12.5 MG tablet Take 1 tablet by mouth Every 12 (Twelve) Hours.     • dorzolamide-timolol (COSOPT) 22.3-6.8 MG/ML ophthalmic solution      • HYDROcodone-acetaminophen (NORCO)  MG per tablet Take 1 tablet by mouth Every 4 (Four) Hours As Needed for Moderate Pain  or Severe Pain . 90 tablet 0   • levothyroxine (SYNTHROID, LEVOTHROID) 50 MCG tablet Take 50 mcg by mouth Daily.     • LORazepam (ATIVAN) 1 MG tablet 1/2-1 po bid prn anxiety 60 tablet 2   • midodrine (PROAMATINE) 5 MG tablet Take 1 tablet by mouth As Needed.     • PARoxetine (PAXIL) 40 MG tablet TAKE ONE AND ONE-HALF (1 & 1/2) TABLET BY MOUTH DAILY 90 tablet 5   • sevelamer (RENVELA) 800 MG tablet Take 800 mg by mouth 3 (Three) Times a Day With Meals.     • sodium bicarbonate 650 MG tablet Take 650 mg by mouth 4 (Four) Times a Day.      • timolol (TIMOPTIC) 0.25 % ophthalmic solution 1 drop 2 (Two) Times a Day.     • vitamin D (ERGOCALCIFEROL) 06540 UNITS capsule capsule Take 50,000 Units by mouth 1 (One) Time Per Week.     • [DISCONTINUED] LORazepam (ATIVAN) 1 MG tablet 1/2-1 po bid prn anxiety 60 tablet 2   • [DISCONTINUED] STOOL SOFTENER 100 MG capsule TAKE ONE CAPSULE BY MOUTH TWICE A DAY 60 capsule 8   • [DISCONTINUED] warfarin (COUMADIN) 3 MG tablet TAKE ONE TABLET BY MOUTH DAILY AS DIRECTED BY PRESCRIBER 60 tablet 4   • [DISCONTINUED] warfarin (COUMADIN) 5 MG tablet      • ondansetron ODT (ZOFRAN-ODT) 4 MG disintegrating tablet Take 1 tablet by mouth Every 8 (Eight) Hours As Needed for Nausea or Vomiting. 21 tablet 0   • [DISCONTINUED] hydrocortisone 2.5 % ointment Wipe bottom twice daily with pea sized amount 60 g 2     No facility-administered encounter medications on file as of 6/5/2020.        Reviewed use of high risk medication in the elderly: yes  Reviewed for potential of harmful drug interactions in the elderly: yes    Follow Up:  Return in about 6 months (around 12/5/2020), or if symptoms worsen or fail to improve.     An After Visit Summary and PPPS with all of these plans were given to the patient.           ++++++++++++++++++++++++++++++++++++++++++++++++++++++++++++++++++     Chief Complaint   Patient presents with   • Annual Exam     medicare   • Hypothyroidism   • Hypertension   • Hyperlipidemia   • Chronic Kidney Disease     ESRD on hemodialysis   • Coronary Artery Disease   • Atrial Fibrillation   • Diabetes   Due for evaluation of above in addition to AWV today.  Due for hospital f/u as well.  Admitted after av fistula revision due to oxygen requirements post-op, did well, stable and released.  Did also require dialysis while admitted and other reason to hold prior to release.    Hypothyroidism   This is a chronic problem. The current episode started more than 1 year ago. The problem occurs constantly. The problem has  been unchanged. Associated symptoms include a rash and weakness. Pertinent negatives include no abdominal pain, chest pain or fever. Treatments tried: due for TSH recheck as also on amiodarone.   Hypertension   This is a chronic problem. The current episode started more than 1 year ago. The problem is unchanged. Associated symptoms include malaise/fatigue. Pertinent negatives include no chest pain, orthopnea, palpitations, peripheral edema, PND or shortness of breath. The current treatment provides moderate improvement. Hypertensive end-organ damage includes kidney disease and CAD/MI. Identifiable causes of hypertension include chronic renal disease, sleep apnea (patient pulmonary htn as well, non-compliant with cpap and not wearing) and a thyroid problem.   Hyperlipidemia   This is a chronic problem. Exacerbating diseases include chronic renal disease and hypothyroidism. Pertinent negatives include no chest pain or shortness of breath. Current antihyperlipidemic treatment includes statins. The current treatment provides moderate improvement of lipids. There are no compliance problems.  Risk factors for coronary artery disease include dyslipidemia, diabetes mellitus, hypertension, post-menopausal and obesity.   Chronic Kidney Disease   This is a chronic problem. The current episode started more than 1 year ago. Associated symptoms include a rash and weakness. Pertinent negatives include no abdominal pain, chest pain or fever. Treatments tried: on hemodialysis;  gave up dated med list.   Coronary Artery Disease   Presents for follow-up visit. Pertinent negatives include no chest pain, chest pressure, chest tightness, dizziness, leg swelling, palpitations or shortness of breath. Risk factors include hyperlipidemia. The symptoms have been stable.   Atrial Fibrillation   Presents for follow-up visit. Symptoms include weakness. Symptoms are negative for chest pain, dizziness, palpitations and shortness of breath. (On  warfarin, reports no bleeding    To have av fistula revision again soon) Past medical history includes atrial fibrillation, CAD and hyperlipidemia.   Diabetes   She presents for her follow-up diabetic visit. She has type 2 diabetes mellitus. Her disease course has been stable. Pertinent negatives for hypoglycemia include no dizziness. Associated symptoms include weakness. Pertinent negatives for diabetes include no chest pain. (Off meds since on dialysis with a1c normal range now) Symptoms are stable. Diabetic complications include heart disease, nephropathy and peripheral neuropathy. Risk factors for coronary artery disease include diabetes mellitus, dyslipidemia, hypertension, post-menopausal and obesity.   Rash   This is a new problem. Episode onset: 4 weeks. The problem has been gradually worsening since onset. Location: right mid-thigh skin lesion. The rash is characterized by pain and scaling. She was exposed to nothing. Pertinent negatives include no fever or shortness of breath. Past treatments include nothing. The treatment provided no relief.       Review of Systems   Constitution: Positive for malaise/fatigue. Negative for fever.   Cardiovascular: Negative for chest pain, leg swelling, orthopnea, palpitations and paroxysmal nocturnal dyspnea.   Respiratory: Negative for chest tightness and shortness of breath.    Skin: Positive for rash.   Gastrointestinal: Negative for abdominal pain.   Neurological: Positive for weakness. Negative for dizziness.       Social History     Tobacco Use   • Smoking status: Former Smoker     Packs/day: 0.10     Types: Cigarettes     Start date: 1953     Last attempt to quit: 1981     Years since quittin.6   • Smokeless tobacco: Never Used   Substance Use Topics   • Alcohol use: Defer     Comment: denied     O:   Vitals:    20 0844   BP: 112/60   BP Location: Left arm   Patient Position: Sitting   Cuff Size: Adult   Pulse: 63   SpO2: 100%   Weight: 85.7 kg  "(189 lb)   Height: 165.1 cm (65\")   PainSc:   6   PainLoc: Arm     Body mass index is 31.45 kg/m².  Physical Exam   Constitutional: She appears well-developed and well-nourished.   HENT:   Head: Normocephalic and atraumatic.   Neck: Neck supple. No JVD present. No thyromegaly present.   Cardiovascular: Normal rate, regular rhythm and normal heart sounds. Exam reveals no gallop and no friction rub.   No murmur heard.  Pulmonary/Chest: Effort normal and breath sounds normal. No respiratory distress. She has no wheezes. She has no rales.   Abdominal: Soft. Bowel sounds are normal. She exhibits no distension. There is no tenderness. There is no rebound and no guarding.   Musculoskeletal: She exhibits no edema.        Right hip: She exhibits decreased strength.        Left hip: She exhibits decreased strength.   Neurological: She is alert. She exhibits abnormal muscle tone. Gait abnormal.   In transport chair, needs assistance to stand.   Skin: Skin is warm and dry.   Right mid-thigh skin nodule with cetnral crusting, looks c/w keratoacanthoma.   Psychiatric: She has a normal mood and affect. Her behavior is normal.   Nursing note and vitals reviewed.      Brook[place] was seen today for annual exam, hypothyroidism, hypertension, hyperlipidemia, chronic kidney disease, coronary artery disease, atrial fibrillation and diabetes.    Diagnoses and all orders for this visit:    Medicare annual wellness visit, subsequent    Benign essential hypertension    Coronary artery disease involving native coronary artery of native heart without angina pectoris  -     Cancel: Lipid Panel    Paroxysmal atrial fibrillation (CMS/[facility])    Pulmonary hypertension (CMS/[facility])    ANDREA (obstructive sleep apnea)    Type 2 diabetes mellitus with chronic kidney disease on chronic dialysis, without long-term current use of insulin (CMS/[facility])  -     Cancel: Lipid Panel  -     Cancel: Hemoglobin A1c  -     Hemoglobin A1c    Osteomyelitis of metatarsal " (CMS/[facility])    End-stage renal disease on hemodialysis (CMS/[facility])    Chronic anticoagulation    Dyslipidemia  -     Cancel: Lipid Panel  -     Cancel: Hemoglobin A1c  -     Lipid Panel    Keratoacanthoma of lower leg  -     Ambulatory Referral to Dermatology    Other specified hypothyroidism  -     TSH    Anxiety  -     LORazepam (ATIVAN) 1 MG tablet; 1/2-1 po bid prn anxiety    Weakness of both lower extremities    -skin lesion suspect keratoacanthoma, d/w type of skin cancer, refer to dermatology to remove  -hypertension - controlled, continue medications  -CKD - tight blood pressure, blood sugar control and avoid nsaids.  -a. Fib - stable, on chronic anticoagulation  -cad RF reduction, Lipid, BP and BS control.  -HLD - continue statin, recheck lipids.  -hypothyroidism - continue medication, recheck thyroid labs.  Is on amiodarone and due for monitoring.  -patient has tried walker, cane and crutches to aide with ambulation and failed;  She is too weak to utilize and needs wc for ambulatory ADL needs to move to room to room and appts.  She is a high fall risk with high risk for serious injuries.      Patient unable to safely use a cane or walker due to weakness, dyspnea.  Due to  Immobility related to lower extremity weakness, ataxic gait, heart disease and end stage renal disease, a light weight wheel chair is needed to assist with mobility related daily living activites inside the home.  Patient does not have the upper body strength to propel a standard wheel chair, but does have the upper body strength and mental capacity to propel a light weight wheel chair.   Return in about 6 months (around 12/5/2020), or if symptoms worsen or fail to improve.

## 2020-06-05 NOTE — PATIENT INSTRUCTIONS
Advance Directive    Advance directives are legal documents that let you make choices ahead of time about your health care and medical treatment in case you become unable to communicate for yourself. Advance directives are a way for you to communicate your wishes to family, friends, and health care providers. This can help convey your decisions about end-of-life care if you become unable to communicate.  Discussing and writing advance directives should happen over time rather than all at once. Advance directives can be changed depending on your situation and what you want, even after you have signed the advance directives.  If you do not have an advance directive, some states assign family decision makers to act on your behalf based on how closely you are related to them. Each state has its own laws regarding advance directives. You may want to check with your health care provider, , or state representative about the laws in your state. There are different types of advance directives, such as:  · Medical power of .  · Living will.  · Do not resuscitate (DNR) or do not attempt resuscitation (DNAR) order.  Health care proxy and medical power of   A health care proxy, also called a health care agent, is a person who is appointed to make medical decisions for you in cases in which you are unable to make the decisions yourself. Generally, people choose someone they know well and trust to represent their preferences. Make sure to ask this person for an agreement to act as your proxy. A proxy may have to exercise judgment in the event of a medical decision for which your wishes are not known.  A medical power of  is a legal document that names your health care proxy. Depending on the laws in your state, after the document is written, it may also need to be:  · Signed.  · Notarized.  · Dated.  · Copied.  · Witnessed.  · Incorporated into your medical record.  You may also want to appoint  someone to manage your financial affairs in a situation in which you are unable to do so. This is called a durable power of  for finances. It is a separate legal document from the durable power of  for health care. You may choose the same person or someone different from your health care proxy to act as your agent in financial matters.  If you do not appoint a proxy, or if there is a concern that the proxy is not acting in your best interests, a court-appointed guardian may be designated to act on your behalf.  Living will  A living will is a set of instructions documenting your wishes about medical care when you cannot express them yourself. Health care providers should keep a copy of your living will in your medical record. You may want to give a copy to family members or friends. To alert caregivers in case of an emergency, you can place a card in your wallet to let them know that you have a living will and where they can find it. A living will is used if you become:  · Terminally ill.  · Incapacitated.  · Unable to communicate or make decisions.  Items to consider in your living will include:  · The use or non-use of life-sustaining equipment, such as dialysis machines and breathing machines (ventilators).  · A DNR or DNAR order, which is the instruction not to use cardiopulmonary resuscitation (CPR) if breathing or heartbeat stops.  · The use or non-use of tube feeding.  · Withholding of food and fluids.  · Comfort (palliative) care when the goal becomes comfort rather than a cure.  · Organ and tissue donation.  A living will does not give instructions for distributing your money and property if you should pass away. It is recommended that you seek the advice of a  when writing a will. Decisions about taxes, beneficiaries, and asset distribution will be legally binding. This process can relieve your family and friends of any concerns surrounding disputes or questions that may come up about  the distribution of your assets.  DNR or DNAR  A DNR or DNAR order is a request not to have CPR in the event that your heart stops beating or you stop breathing. If a DNR or DNAR order has not been made and shared, a health care provider will try to help any patient whose heart has stopped or who has stopped breathing. If you plan to have surgery, talk with your health care provider about how your DNR or DNAR order will be followed if problems occur.  Summary  · Advance directives are the legal documents that allow you to make choices ahead of time about your health care and medical treatment in case you become unable to communicate for yourself.  · The process of discussing and writing advance directives should happen over time. You can change the advance directives, even after you have signed them.  · Advance directives include DNR or DNAR orders, living mays, and designating an agent as your medical power of .  This information is not intended to replace advice given to you by your health care provider. Make sure you discuss any questions you have with your health care provider.  Document Released: 03/26/2009 Document Revised: 01/22/2020 Document Reviewed: 11/06/2017  [employer] Patient Education © 2020 [employer]      Calorie Counting for Weight Loss  Calories are units of energy. Your body needs a certain amount of calories from food to keep you going throughout the day. When you eat more calories than your body needs, your body stores the extra calories as fat. When you eat fewer calories than your body needs, your body burns fat to get the energy it needs.  Calorie counting means keeping track of how many calories you eat and drink each day. Calorie counting can be helpful if you need to lose weight. If you make sure to eat fewer calories than your body needs, you should lose weight. Ask your health care provider what a healthy weight is for you.  For calorie counting to work, you will need to eat  the right number of calories in a day in order to lose a healthy amount of weight per week. A dietitian can help you determine how many calories you need in a day and will give you suggestions on how to reach your calorie goal.  · A healthy amount of weight to lose per week is usually 1-2 lb (0.5-0.9 kg). This usually means that your daily calorie intake should be reduced by 500-750 calories.  · Eating 1,200 - 1,500 calories per day can help most women lose weight.  · Eating 1,500 - 1,800 calories per day can help most men lose weight.  What is my plan?  My goal is to have __________ calories per day.  If I have this many calories per day, I should lose around __________ pounds per week.  What do I need to know about calorie counting?  In order to meet your daily calorie goal, you will need to:  · Find out how many calories are in each food you would like to eat. Try to do this before you eat.  · Decide how much of the food you plan to eat.  · Write down what you ate and how many calories it had. Doing this is called keeping a food log.  To successfully lose weight, it is important to balance calorie counting with a healthy lifestyle that includes regular activity. Aim for 150 minutes of moderate exercise (such as walking) or 75 minutes of vigorous exercise (such as running) each week.  Where do I find calorie information?    The number of calories in a food can be found on a Nutrition Facts label. If a food does not have a Nutrition Facts label, try to look up the calories online or ask your dietitian for help.  Remember that calories are listed per serving. If you choose to have more than one serving of a food, you will have to multiply the calories per serving by the amount of servings you plan to eat. For example, the label on a package of bread might say that a serving size is 1 slice and that there are 90 calories in a serving. If you eat 1 slice, you will have eaten 90 calories. If you eat 2 slices, you will  "have eaten 180 calories.  How do I keep a food log?  Immediately after each meal, record the following information in your food log:  · What you ate. Don't forget to include toppings, sauces, and other extras on the food.  · How much you ate. This can be measured in cups, ounces, or number of items.  · How many calories each food and drink had.  · The total number of calories in the meal.  Keep your food log near you, such as in a small notebook in your pocket, or use a mobile kimberli or website. Some programs will calculate calories for you and show you how many calories you have left for the day to meet your goal.  What are some calorie counting tips?    · Use your calories on foods and drinks that will fill you up and not leave you hungry:  ? Some examples of foods that fill you up are nuts and nut butters, vegetables, lean proteins, and high-fiber foods like whole grains. High-fiber foods are foods with more than 5 g fiber per serving.  ? Drinks such as sodas, specialty coffee drinks, alcohol, and juices have a lot of calories, yet do not fill you up.  · Eat nutritious foods and avoid empty calories. Empty calories are calories you get from foods or beverages that do not have many vitamins or protein, such as candy, sweets, and soda. It is better to have a nutritious high-calorie food (such as an avocado) than a food with few nutrients (such as a bag of chips).  · Know how many calories are in the foods you eat most often. This will help you calculate calorie counts faster.  · Pay attention to calories in drinks. Low-calorie drinks include water and unsweetened drinks.  · Pay attention to nutrition labels for \"low fat\" or \"fat free\" foods. These foods sometimes have the same amount of calories or more calories than the full fat versions. They also often have added sugar, starch, or salt, to make up for flavor that was removed with the fat.  · Find a way of tracking calories that works for you. Get creative. Try " different apps or programs if writing down calories does not work for you.  What are some portion control tips?  · Know how many calories are in a serving. This will help you know how many servings of a certain food you can have.  · Use a measuring cup to measure serving sizes. You could also try weighing out portions on a kitchen scale. With time, you will be able to estimate serving sizes for some foods.  · Take some time to put servings of different foods on your favorite plates, bowls, and cups so you know what a serving looks like.  · Try not to eat straight from a bag or box. Doing this can lead to overeating. Put the amount you would like to eat in a cup or on a plate to make sure you are eating the right portion.  · Use smaller plates, glasses, and bowls to prevent overeating.  · Try not to multitask (for example, watch TV or use your computer) while eating. If it is time to eat, sit down at a table and enjoy your food. This will help you to know when you are full. It will also help you to be aware of what you are eating and how much you are eating.  What are tips for following this plan?  Reading food labels  · Check the calorie count compared to the serving size. The serving size may be smaller than what you are used to eating.  · Check the source of the calories. Make sure the food you are eating is high in vitamins and protein and low in saturated and trans fats.  Shopping  · Read nutrition labels while you shop. This will help you make healthy decisions before you decide to purchase your food.  · Make a grocery list and stick to it.  Cooking  · Try to cook your favorite foods in a healthier way. For example, try baking instead of frying.  · Use low-fat dairy products.  Meal planning  · Use more fruits and vegetables. Half of your plate should be fruits and vegetables.  · Include lean proteins like poultry and fish.  How do I count calories when eating out?  · Ask for smaller portion sizes.  · Consider  "sharing an entree and sides instead of getting your own entree.  · If you get your own entree, eat only half. Ask for a box at the beginning of your meal and put the rest of your entree in it so you are not tempted to eat it.  · If calories are listed on the menu, choose the lower calorie options.  · Choose dishes that include vegetables, fruits, whole grains, low-fat dairy products, and lean protein.  · Choose items that are boiled, broiled, grilled, or steamed. Stay away from items that are buttered, battered, fried, or served with cream sauce. Items labeled \"crispy\" are usually fried, unless stated otherwise.  · Choose water, low-fat milk, unsweetened iced tea, or other drinks without added sugar. If you want an alcoholic beverage, choose a lower calorie option such as a glass of wine or light beer.  · Ask for dressings, sauces, and syrups on the side. These are usually high in calories, so you should limit the amount you eat.  · If you want a salad, choose a garden salad and ask for grilled meats. Avoid extra toppings like griggs, cheese, or fried items. Ask for the dressing on the side, or ask for olive oil and vinegar or lemon to use as dressing.  · Estimate how many servings of a food you are given. For example, a serving of cooked rice is ½ cup or about the size of half a baseball. Knowing serving sizes will help you be aware of how much food you are eating at restaurants. The list below tells you how big or small some common portion sizes are based on everyday objects:  ? 1 oz--4 stacked dice.  ? 3 oz--1 deck of cards.  ? 1 tsp--1 die.  ? 1 Tbsp--½ a ping-pong ball.  ? 2 Tbsp--1 ping-pong ball.  ? ½ cup--½ baseball.  ? 1 cup--1 baseball.  Summary  · Calorie counting means keeping track of how many calories you eat and drink each day. If you eat fewer calories than your body needs, you should lose weight.  · A healthy amount of weight to lose per week is usually 1-2 lb (0.5-0.9 kg). This usually means " reducing your daily calorie intake by 500-750 calories.  · The number of calories in a food can be found on a Nutrition Facts label. If a food does not have a Nutrition Facts label, try to look up the calories online or ask your dietitian for help.  · Use your calories on foods and drinks that will fill you up, and not on foods and drinks that will leave you hungry.  · Use smaller plates, glasses, and bowls to prevent overeating.  This information is not intended to replace advice given to you by your health care provider. Make sure you discuss any questions you have with your health care provider.  Document Released: 12/18/2006 Document Revised: 09/06/2019 Document Reviewed: 11/17/2017  [employer] Patient Education © 2020 [employer]      Exercising to Lose Weight  Exercise is structured, repetitive physical activity to improve fitness and health. Getting regular exercise is important for everyone. It is especially important if you are overweight. Being overweight increases your risk of heart disease, stroke, diabetes, high blood pressure, and several types of cancer. Reducing your calorie intake and exercising can help you lose weight.  Exercise is usually categorized as moderate or vigorous intensity. To lose weight, most people need to do a certain amount of moderate-intensity or vigorous-intensity exercise each week.  Moderate-intensity exercise    Moderate-intensity exercise is any activity that gets you moving enough to burn at least three times more energy (calories) than if you were sitting.  Examples of moderate exercise include:  · Walking a mile in 15 minutes.  · Doing light yard work.  · Biking at an easy pace.  Most people should get at least 150 minutes (2 hours and 30 minutes) a week of moderate-intensity exercise to maintain their body weight.  Vigorous-intensity exercise  Vigorous-intensity exercise is any activity that gets you moving enough to burn at least six times more calories than if you  were sitting. When you exercise at this intensity, you should be working hard enough that you are not able to carry on a conversation.  Examples of vigorous exercise include:  · Running.  · Playing a team sport, such as football, basketball, and soccer.  · Jumping rope.  Most people should get at least 75 minutes (1 hour and 15 minutes) a week of vigorous-intensity exercise to maintain their body weight.  How can exercise affect me?  When you exercise enough to burn more calories than you eat, you lose weight. Exercise also reduces body fat and builds muscle. The more muscle you have, the more calories you burn. Exercise also:  · Improves mood.  · Reduces stress and tension.  · Improves your overall fitness, flexibility, and endurance.  · Increases bone strength.  The amount of exercise you need to lose weight depends on:  · Your age.  · The type of exercise.  · Any health conditions you have.  · Your overall physical ability.  Talk to your health care provider about how much exercise you need and what types of activities are safe for you.  What actions can I take to lose weight?  Nutrition    · Make changes to your diet as told by your health care provider or diet and nutrition specialist (dietitian). This may include:  ? Eating fewer calories.  ? Eating more protein.  ? Eating less unhealthy fats.  ? Eating a diet that includes fresh fruits and vegetables, whole grains, low-fat dairy products, and lean protein.  ? Avoiding foods with added fat, salt, and sugar.  · Drink plenty of water while you exercise to prevent dehydration or heat stroke.  Activity  · Choose an activity that you enjoy and set realistic goals. Your health care provider can help you make an exercise plan that works for you.  · Exercise at a moderate or vigorous intensity most days of the week.  ? The intensity of exercise may vary from person to person. You can tell how intense a workout is for you by paying attention to your breathing and  heartbeat. Most people will notice their breathing and heartbeat get faster with more intense exercise.  · Do resistance training twice each week, such as:  ? Push-ups.  ? Sit-ups.  ? Lifting weights.  ? Using resistance bands.  · Getting short amounts of exercise can be just as helpful as long structured periods of exercise. If you have trouble finding time to exercise, try to include exercise in your daily routine.  ? Get up, stretch, and walk around every 30 minutes throughout the day.  ? Go for a walk during your lunch break.  ? Park your car farther away from your destination.  ? If you take public transportation, get off one stop early and walk the rest of the way.  ? Make phone calls while standing up and walking around.  ? Take the stairs instead of elevators or escalators.  · Wear comfortable clothes and shoes with good support.  · Do not exercise so much that you hurt yourself, feel dizzy, or get very short of breath.  Where to find more information  · [place] Department of Health and Human Services: www.hhs.gov  · Centers for Disease Control and Prevention (CDC): www.cdc.gov  Contact a health care provider:  · Before starting a new exercise program.  · If you have questions or concerns about your weight.  · If you have a medical problem that keeps you from exercising.  Get help right away if you have any of the following while exercising:  · Injury.  · Dizziness.  · Difficulty breathing or shortness of breath that does not go away when you stop exercising.  · Chest pain.  · Rapid heartbeat.  Summary  · Being overweight increases your risk of heart disease, stroke, diabetes, high blood pressure, and several types of cancer.  · Losing weight happens when you burn more calories than you eat.  · Reducing the amount of calories you eat in addition to getting regular moderate or vigorous exercise each week helps you lose weight.  This information is not intended to replace advice given to you by your health care  provider. Make sure you discuss any questions you have with your health care provider.  Document Released: 01/20/2012 Document Revised: 12/31/2018 Document Reviewed: 12/31/2018  [employer] Patient Education © 2020 [employer]      Fall Prevention in the Home, Adult  Falls can cause injuries and can affect people from all age groups. There are many simple things that you can do to make your home safe and to help prevent falls. Ask for help when making these changes, if needed.  What actions can I take to prevent falls?  General instructions  · Use good lighting in all rooms. Replace any light bulbs that burn out.  · Turn on lights if it is dark. Use night-lights.  · Place frequently used items in easy-to-reach places. Lower the shelves around your home if necessary.  · Set up furniture so that there are clear paths around it. Avoid moving your furniture around.  · Remove throw rugs and other tripping hazards from the floor.  · Avoid walking on wet floors.  · Fix any uneven floor surfaces.  · Add color or contrast paint or tape to grab bars and handrails in your home. Place contrasting color strips on the first and last steps of stairways.  · When you use a stepladder, make sure that it is completely opened and that the sides are firmly locked. Have someone hold the ladder while you are using it. Do not climb a closed stepladder.  · Be aware of any and all pets.  What can I do in the bathroom?         · Keep the floor dry. Immediately clean up any water that spills onto the floor.  · Remove soap buildup in the tub or shower on a regular basis.  · Use non-skid mats or decals on the floor of the tub or shower.  · Attach bath mats securely with double-sided, non-slip rug tape.  · If you need to sit down while you are in the shower, use a plastic, non-slip stool.  · Install grab bars by the toilet and in the tub and shower. Do not use towel bars as grab bars.  What can I do in the bedroom?  · Make sure that a bedside  light is easy to reach.  · Do not use oversized bedding that drapes onto the floor.  · Have a firm chair that has side arms to use for getting dressed.  What can I do in the kitchen?  · Clean up any spills right away.  · If you need to reach for something above you, use a sturdy step stool that has a grab bar.  · Keep electrical cables out of the way.  · Do not use floor polish or wax that makes floors slippery. If you must use wax, make sure that it is non-skid floor wax.  What can I do in the stairways?  · Do not leave any items on the stairs.  · Make sure that you have a light switch at the top of the stairs and the bottom of the stairs. Have them installed if you do not have them.  · Make sure that there are handrails on both sides of the stairs. Fix handrails that are broken or loose. Make sure that handrails are as long as the stairways.  · Install non-slip stair treads on all stairs in your home.  · Avoid having throw rugs at the top or bottom of stairways, or secure the rugs with carpet tape to prevent them from moving.  · Choose a carpet design that does not hide the edge of steps on the stairway.  · Check any carpeting to make sure that it is firmly attached to the stairs. Fix any carpet that is loose or worn.  What can I do on the outside of my home?  · Use bright outdoor lighting.  · Regularly repair the edges of walkways and driveways and fix any cracks.  · Remove high doorway thresholds.  · Trim any shrubbery on the main path into your home.  · Regularly check that handrails are securely fastened and in good repair. Both sides of any steps should have handrails.  · Install guardrails along the edges of any raised decks or porches.  · Clear walkways of debris and clutter, including tools and rocks.  · Have leaves, snow, and ice cleared regularly.  · Use sand or salt on walkways during winter months.  · In the garage, clean up any spills right away, including grease or oil spills.  What other actions can  I take?  · Wear closed-toe shoes that fit well and support your feet. Wear shoes that have rubber soles or low heels.  · Use mobility aids as needed, such as canes, walkers, scooters, and crutches.  · Review your medicines with your health care provider. Some medicines can cause dizziness or changes in blood pressure, which increase your risk of falling.  Talk with your health care provider about other ways that you can decrease your risk of falls. This may include working with a physical therapist or  to improve your strength, balance, and endurance.  Where to find more information  · Centers for Disease Control and Prevention, [place]: https://www.cdc.gov  · [place]: https://bt7itik.livier.nih.gov  Contact a health care provider if:  · You are afraid of falling at home.  · You feel weak, drowsy, or dizzy at home.  · You fall at home.  Summary  · There are many simple things that you can do to make your home safe and to help prevent falls.  · Ways to make your home safe include removing tripping hazards and installing grab bars in the bathroom.  · Ask for help when making these changes in your home.  This information is not intended to replace advice given to you by your health care provider. Make sure you discuss any questions you have with your health care provider.  Document Released: 12/08/2003 Document Revised: 11/30/2018 Document Reviewed: 08/02/2018  Elsevier Patient Education © 2020 Elsevier Inc.

## 2020-06-06 LAB
CHOLEST SERPL-MCNC: 159 MG/DL (ref 0–200)
HBA1C MFR BLD: 5.5 % (ref 4.8–5.6)
HDLC SERPL-MCNC: 80 MG/DL (ref 40–60)
LDLC SERPL CALC-MCNC: 50 MG/DL (ref 0–100)
TRIGL SERPL-MCNC: 147 MG/DL (ref 0–150)
TSH SERPL DL<=0.005 MIU/L-ACNC: 3.8 UIU/ML (ref 0.27–4.2)
VLDLC SERPL CALC-MCNC: 29.4 MG/DL (ref 5–40)

## 2020-06-08 ENCOUNTER — TELEPHONE (OUTPATIENT)
Dept: FAMILY MEDICINE CLINIC | Facility: CLINIC | Age: 80
End: 2020-06-08

## 2020-06-08 NOTE — TELEPHONE ENCOUNTER
PT DAUGHTER LINNETTE CALLED IN WANTING TO KNOW THE NAME OF THE DERMATOLOGIST THAT HER MOTHER HAS BEEN REFERRED TO.    CALL BACK   [phone]

## 2020-07-02 ENCOUNTER — TELEPHONE (OUTPATIENT)
Dept: FAMILY MEDICINE CLINIC | Facility: CLINIC | Age: 80
End: 2020-07-02

## 2020-07-02 DIAGNOSIS — L97.509 ULCER OF FOOT, UNSPECIFIED LATERALITY, UNSPECIFIED ULCER STAGE (HCC): Primary | ICD-10-CM

## 2020-07-28 ENCOUNTER — TELEPHONE (OUTPATIENT)
Dept: FAMILY MEDICINE CLINIC | Facility: CLINIC | Age: 80
End: 2020-07-28

## 2020-07-28 NOTE — TELEPHONE ENCOUNTER
I called and spoke with family at length;  She is not terminal and I dont' see comments in d/c summary about palliative care;  Currently on dialysis, receives vancomycin at that time and wound care is being addressed.  They don't feel needs palliative care at this time.  I did explain at length palliative care doesn't necessarily mean terminal and that service often offered to patients high risk for frequent admissions.  She does have very serious health conditions and they are aware of this.

## 2020-07-29 RX ORDER — DOCUSATE SODIUM 100 MG
CAPSULE ORAL
Qty: 60 CAPSULE | Refills: 7 | Status: SHIPPED | OUTPATIENT
Start: 2020-07-29

## 2020-07-31 ENCOUNTER — TELEPHONE (OUTPATIENT)
Dept: FAMILY MEDICINE CLINIC | Facility: CLINIC | Age: 80
End: 2020-07-31

## 2020-07-31 DIAGNOSIS — I48.0 PAROXYSMAL ATRIAL FIBRILLATION (HCC): Primary | ICD-10-CM

## 2020-08-06 PROBLEM — R78.81 MRSA BACTEREMIA: Status: ACTIVE | Noted: 2020-06-23

## 2020-08-06 PROBLEM — B95.62 MRSA BACTEREMIA: Status: ACTIVE | Noted: 2020-06-23

## 2020-08-06 PROBLEM — E11.621 DIABETIC ULCER OF TOE OF RIGHT FOOT (HCC): Status: ACTIVE | Noted: 2020-06-22

## 2020-08-06 PROBLEM — Z51.5 FOLLOWED BY PALLIATIVE CARE SERVICE: Status: ACTIVE | Noted: 2020-07-21

## 2020-08-06 PROBLEM — E21.0 PRIMARY HYPERPARATHYROIDISM (HCC): Status: ACTIVE | Noted: 2020-08-06

## 2020-08-06 PROBLEM — L97.519 DIABETIC ULCER OF TOE OF RIGHT FOOT (HCC): Status: ACTIVE | Noted: 2020-06-22

## 2020-08-06 PROBLEM — J18.9 HEALTHCARE-ASSOCIATED PNEUMONIA: Status: ACTIVE | Noted: 2020-07-19

## 2020-08-06 PROBLEM — E83.30 DISORDER OF PHOSPHORUS METABOLISM: Status: ACTIVE | Noted: 2020-08-06

## 2020-08-06 PROBLEM — A41.9 SEPSIS (HCC): Status: ACTIVE | Noted: 2020-06-22

## 2020-08-06 PROBLEM — R53.1 GENERALIZED WEAKNESS: Status: ACTIVE | Noted: 2020-07-19

## 2020-08-06 PROBLEM — R79.1 SUPRATHERAPEUTIC INR: Status: ACTIVE | Noted: 2020-07-08

## 2020-08-07 ENCOUNTER — TELEPHONE (OUTPATIENT)
Dept: FAMILY MEDICINE CLINIC | Facility: CLINIC | Age: 80
End: 2020-08-07

## 2020-08-10 ENCOUNTER — TELEPHONE (OUTPATIENT)
Dept: FAMILY MEDICINE CLINIC | Facility: CLINIC | Age: 80
End: 2020-08-10

## 2020-08-11 DIAGNOSIS — K92.1 BLOOD IN STOOL: Primary | ICD-10-CM

## 2020-08-11 LAB
EXPIRATION DATE: ABNORMAL
GASTROCULT GAST QL: POSITIVE
Lab: ABNORMAL

## 2020-08-11 PROCEDURE — 82274 ASSAY TEST FOR BLOOD FECAL: CPT | Performed by: FAMILY MEDICINE

## 2020-09-01 ENCOUNTER — TELEPHONE (OUTPATIENT)
Dept: FAMILY MEDICINE CLINIC | Facility: CLINIC | Age: 80
End: 2020-09-01

## 2020-10-06 ENCOUNTER — TELEPHONE (OUTPATIENT)
Dept: FAMILY MEDICINE CLINIC | Facility: CLINIC | Age: 80
End: 2020-10-06

## 2020-11-27 ENCOUNTER — TELEPHONE (OUTPATIENT)
Dept: FAMILY MEDICINE CLINIC | Facility: CLINIC | Age: 80
End: 2020-11-27

## 2020-11-27 DIAGNOSIS — F41.9 ANXIETY: ICD-10-CM

## 2020-11-30 RX ORDER — LORAZEPAM 1 MG/1
TABLET ORAL
Qty: 60 TABLET | Refills: 1 | Status: SHIPPED | OUTPATIENT
Start: 2020-11-30 | End: 2020-12-28 | Stop reason: SDUPTHER

## 2020-11-30 NOTE — TELEPHONE ENCOUNTER
Caller: Brooklyn Milan BERNIE    Relationship: Self    Best call back number: [phone] (MS. MILAN WOULD LIKE A CALL BACK WHEN REFILL REQUEST HAS BEEN COMPLETED.)    Medication needed:   Requested Prescriptions     Pending Prescriptions Disp Refills   • LORazepam (ATIVAN) 1 MG tablet [Pharmacy Med Name: LORazepam 1 MG TABLET] 60 tablet 1     Sig: TAKE ONE-HALF TO ONE TABLET BY MOUTH TWO TIMES A DAY AS NEEDED FOR ANXIETY       When do you need the refill by: TODAY  What details did the patient provide when requesting the medication: COMPLETLEY OUT OF MEDICATION    Does the patient have less than a 3 day supply:  [x] Yes  [] No    What is the patient's preferred pharmacy: [address] [address] [place] [facility] [phone] [facility] [phone] FX

## 2020-12-28 ENCOUNTER — TELEPHONE (OUTPATIENT)
Dept: FAMILY MEDICINE CLINIC | Facility: CLINIC | Age: 80
End: 2020-12-28

## 2020-12-28 DIAGNOSIS — F41.9 ANXIETY: ICD-10-CM

## 2020-12-28 RX ORDER — LORAZEPAM 1 MG/1
TABLET ORAL
Qty: 90 TABLET | Refills: 1 | Status: SHIPPED | OUTPATIENT
Start: 2020-12-28 | End: 2021-04-01 | Stop reason: SDUPTHER

## 2020-12-28 NOTE — TELEPHONE ENCOUNTER
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 Cline at [facility] and she should mention to her kidney specialist.  I did send in an increased frequency on ativan.  If pain stays bad, may need just send to pain mgmt as hard to control this.  RRJ      Pt's daughter said pt has calcium build up on her leg due pt's dialysis. Pt sees wound care tomorrow and that doctor doesn't prescribe any controlled meds that they know of. Pt was told to put some numbing medicine and purple topical (like iodine). Pt has been very upset about it and has been taking 2 tabs of her ativan to calm her down. Please advise or send.

## 2020-12-28 NOTE — TELEPHONE ENCOUNTER
PATIENT HAS A KNOT ON HER LEFT LEG THAT IS VERY PAINFUL. SHE HAS BEEN DOUBLING HER LORazepam (ATIVAN) 1 MG tablet.  HER WOUND CARE DR ORDERED CALCIPHYLAXIS SODIUM WITH HER DIALYSIS BUT IT HAS NOT HELPED AND SHE HAS HAD FOR 3 WEEKS.    PLEASE ADVISE  [phone]

## 2021-01-04 ENCOUNTER — TELEPHONE (OUTPATIENT)
Dept: FAMILY MEDICINE CLINIC | Facility: CLINIC | Age: 81
End: 2021-01-04

## 2021-01-04 NOTE — TELEPHONE ENCOUNTER
Pt's daughter called and wants to know if you send me a hemorrhoid cream for pt. She had one once before but in the move she thinks it was thrown away. Please send if okay.

## 2021-01-11 RX ORDER — ONDANSETRON 4 MG/1
TABLET, ORALLY DISINTEGRATING ORAL
Qty: 21 TABLET | Refills: 0 | Status: SHIPPED | OUTPATIENT
Start: 2021-01-11

## 2021-03-30 DIAGNOSIS — F41.9 ANXIETY: ICD-10-CM

## 2021-03-30 RX ORDER — LORAZEPAM 1 MG/1
TABLET ORAL
Qty: 90 TABLET | Refills: 0 | OUTPATIENT
Start: 2021-03-30

## 2021-03-31 DIAGNOSIS — F41.9 ANXIETY: ICD-10-CM

## 2021-03-31 RX ORDER — LORAZEPAM 1 MG/1
TABLET ORAL
Qty: 90 TABLET | Refills: 1 | OUTPATIENT
Start: 2021-03-31

## 2021-04-01 DIAGNOSIS — F41.9 ANXIETY: ICD-10-CM

## 2021-04-01 NOTE — TELEPHONE ENCOUNTER
PATIENT'S DAUGHTER, LINNETTE IS CALLING TO STATE PATIENT HAS BEEN OUT OF THE MEDICATION FOR 2 DAYS.  SHE MAKE THE FIRST AVAILABLE APPOINTMENT 05/20/21 WITH DR. DEMPSEY.     LORazepam (ATIVAN) 1 MG tablet     [address] [address] [place] - [phone]  - [phone] FX  [phone]    PLEASE ADVISE.

## 2021-04-01 NOTE — TELEPHONE ENCOUNTER
Caller: La Hernandez    Relationship: Emergency Contact    Best call back number:[phone]     Medication needed:   Requested Prescriptions     Pending Prescriptions Disp Refills   • LORazepam (ATIVAN) 1 MG tablet 90 tablet 1     Sig: TAKE ONE-HALF TO ONE TABLET BY MOUTH THREE TIMES A DAY AS NEEDED FOR ANXIETY       When do you need the refill by: ASAP  Does the patient have less than a 3 day supply:  [x] Yes  [] No    What is the patient's preferred pharmacy: [facility] [address] [address] [place] [facility] [phone] [facility] [phone] FX

## 2021-04-02 RX ORDER — LORAZEPAM 1 MG/1
TABLET ORAL
Qty: 90 TABLET | Refills: 1 | Status: SHIPPED | OUTPATIENT
Start: 2021-04-02

## 2021-04-05 ENCOUNTER — APPOINTMENT (OUTPATIENT)
Dept: VACCINE CLINIC | Facility: HOSPITAL | Age: 81
End: 2021-04-05

## 2021-04-10 DIAGNOSIS — F41.9 ANXIETY: ICD-10-CM

## 2021-04-12 RX ORDER — PAROXETINE 30 MG/1
45 TABLET, FILM COATED ORAL EVERY MORNING
Qty: 45 TABLET | Refills: 0 | Status: SHIPPED | OUTPATIENT
Start: 2021-04-12

## 2021-04-27 ENCOUNTER — TELEPHONE (OUTPATIENT)
Dept: FAMILY MEDICINE CLINIC | Facility: CLINIC | Age: 81
End: 2021-04-27

## 2021-04-27 NOTE — TELEPHONE ENCOUNTER
Can you make a spot Courtney for 10:30am.  I know there is one at 10 am wanting an hour, but need to see her.  RRJ

## 2021-04-27 NOTE — TELEPHONE ENCOUNTER
Caller: La Hernandez    Relationship to patient: Emergency Contact    Best call back number: [phone]    Chief complaint: SHORTNESS OF BREATH-WHEN DAUGHTER TAKES OXYGEN LEVEL, SEEMS LOW    Type of visit: SAME DAY    Requested date: TODAY 4/27     Additional notes: PLEASE CALL PATIENTS DAUGHTER TO ADVISE IF SHE CAN BE WORKED IN TODAY. CALLED , WERE NO OPEN SAME DAY APPTS AVAILABLE. WAS ADVISED TO SEND NOTE TO  TO SEE IF SHE CAN BE WORKED IN TODAY.

## 2021-04-28 ENCOUNTER — OFFICE VISIT (OUTPATIENT)
Dept: FAMILY MEDICINE CLINIC | Facility: CLINIC | Age: 81
End: 2021-04-28

## 2021-04-28 VITALS
DIASTOLIC BLOOD PRESSURE: 64 MMHG | HEIGHT: 65 IN | SYSTOLIC BLOOD PRESSURE: 112 MMHG | HEART RATE: 78 BPM | BODY MASS INDEX: 31.45 KG/M2 | OXYGEN SATURATION: 95 %

## 2021-04-28 DIAGNOSIS — R41.0 DELIRIUM: ICD-10-CM

## 2021-04-28 DIAGNOSIS — R53.83 OTHER FATIGUE: Primary | ICD-10-CM

## 2021-04-28 DIAGNOSIS — N18.6 TYPE 2 DIABETES MELLITUS WITH CHRONIC KIDNEY DISEASE ON CHRONIC DIALYSIS, WITHOUT LONG-TERM CURRENT USE OF INSULIN (HCC): ICD-10-CM

## 2021-04-28 DIAGNOSIS — E11.22 TYPE 2 DIABETES MELLITUS WITH CHRONIC KIDNEY DISEASE ON CHRONIC DIALYSIS, WITHOUT LONG-TERM CURRENT USE OF INSULIN (HCC): ICD-10-CM

## 2021-04-28 DIAGNOSIS — Z99.2 TYPE 2 DIABETES MELLITUS WITH CHRONIC KIDNEY DISEASE ON CHRONIC DIALYSIS, WITHOUT LONG-TERM CURRENT USE OF INSULIN (HCC): ICD-10-CM

## 2021-04-28 DIAGNOSIS — R06.02 SHORTNESS OF BREATH: ICD-10-CM

## 2021-04-28 PROBLEM — Z86.31 PERSONAL HISTORY OF DIABETIC FOOT ULCER: Status: ACTIVE | Noted: 2020-06-22

## 2021-04-28 PROBLEM — T78.40XA ALLERGY, UNSPECIFIED, INITIAL ENCOUNTER: Status: ACTIVE | Noted: 2020-09-14

## 2021-04-28 PROBLEM — E87.5 HYPERKALEMIA: Status: ACTIVE | Noted: 2020-11-03

## 2021-04-28 PROBLEM — E16.2 HYPOGLYCEMIA, UNSPECIFIED: Status: ACTIVE | Noted: 2019-07-22

## 2021-04-28 PROBLEM — R19.7 DIARRHEA, UNSPECIFIED: Status: ACTIVE | Noted: 2017-03-23

## 2021-04-28 PROBLEM — Z97.3 PRESENCE OF SPECTACLES AND CONTACT LENSES: Status: ACTIVE | Noted: 2017-12-30

## 2021-04-28 PROBLEM — Z48.00 ENCOUNTER FOR CHANGE OR REMOVAL OF NONSURGICAL WOUND DRESSING: Status: ACTIVE | Noted: 2020-05-21

## 2021-04-28 PROBLEM — I25.10 CORONARY ARTERY DISEASE: Status: ACTIVE | Noted: 2020-09-14

## 2021-04-28 PROBLEM — M71.49: Status: ACTIVE | Noted: 2020-12-10

## 2021-04-28 PROBLEM — Z89.422 ACQUIRED ABSENCE OF OTHER LEFT TOE(S) (HCC): Status: ACTIVE | Noted: 2017-03-20

## 2021-04-28 PROBLEM — Z85.841: Status: ACTIVE | Noted: 2020-09-14

## 2021-04-28 PROBLEM — Z79.891 LONG TERM (CURRENT) USE OF OPIATE ANALGESIC: Status: ACTIVE | Noted: 2017-12-30

## 2021-04-28 PROBLEM — T82.898A OTHER SPECIFIED COMPLICATION OF VASCULAR PROSTHETIC DEVICES, IMPLANTS AND GRAFTS, INITIAL ENCOUNTER (HCC): Status: ACTIVE | Noted: 2021-03-17

## 2021-04-28 PROBLEM — T78.2XXA ANAPHYLACTIC SHOCK, UNSPECIFIED, INITIAL ENCOUNTER: Status: ACTIVE | Noted: 2020-09-14

## 2021-04-28 PROBLEM — T82.9XXA COMPLICATION OF AV DIALYSIS FISTULA: Status: ACTIVE | Noted: 2020-08-14

## 2021-04-28 PROBLEM — E87.70 VOLUME OVERLOAD: Status: ACTIVE | Noted: 2020-10-24

## 2021-04-28 PROBLEM — E83.51 HYPOCALCEMIA: Status: ACTIVE | Noted: 2020-02-07

## 2021-04-28 PROBLEM — E11.9 DIABETES MELLITUS (HCC): Status: ACTIVE | Noted: 2018-10-30

## 2021-04-28 PROBLEM — E66.9 OBESITY, UNSPECIFIED: Status: ACTIVE | Noted: 2020-08-24

## 2021-04-28 PROBLEM — E03.9 HYPOTHYROIDISM, UNSPECIFIED: Status: ACTIVE | Noted: 2020-09-14

## 2021-04-28 PROBLEM — I50.22 CHRONIC SYSTOLIC (CONGESTIVE) HEART FAILURE (HCC): Status: ACTIVE | Noted: 2019-02-07

## 2021-04-28 PROBLEM — I48.0 PAROXYSMAL ATRIAL FIBRILLATION (HCC): Status: ACTIVE | Noted: 2017-03-01

## 2021-04-28 PROBLEM — R47.01 APHASIA: Status: ACTIVE | Noted: 2020-08-20

## 2021-04-28 PROBLEM — T82.7XXA INFECTION OF DIALYSIS VASCULAR ACCESS (HCC): Status: ACTIVE | Noted: 2020-09-07

## 2021-04-28 PROBLEM — E46 UNSPECIFIED PROTEIN-CALORIE MALNUTRITION (HCC): Status: ACTIVE | Noted: 2017-02-11

## 2021-04-28 PROBLEM — I10 BENIGN ESSENTIAL HTN: Status: ACTIVE | Noted: 2021-04-28

## 2021-04-28 PROBLEM — R07.9 CHEST PAIN, UNSPECIFIED: Status: ACTIVE | Noted: 2017-03-23

## 2021-04-28 PROBLEM — Z79.01 LONG TERM (CURRENT) USE OF ANTICOAGULANTS: Status: ACTIVE | Noted: 2017-12-27

## 2021-04-28 PROBLEM — I73.9 PERIPHERAL VASCULAR DISEASE, UNSPECIFIED (HCC): Status: ACTIVE | Noted: 2020-08-20

## 2021-04-28 PROBLEM — I34.89 OTHER NONRHEUMATIC MITRAL VALVE DISORDERS: Status: ACTIVE | Noted: 2017-03-01

## 2021-04-28 PROBLEM — G47.30 SLEEP APNEA: Status: ACTIVE | Noted: 2021-04-28

## 2021-04-28 PROBLEM — E83.59 OTHER DISORDERS OF CALCIUM METABOLISM: Status: ACTIVE | Noted: 2020-12-10

## 2021-04-28 PROCEDURE — 99214 OFFICE O/P EST MOD 30 MIN: CPT | Performed by: FAMILY MEDICINE

## 2021-04-28 RX ORDER — ALBUTEROL SULFATE 90 UG/1
2 AEROSOL, METERED RESPIRATORY (INHALATION) EVERY 4 HOURS PRN
Qty: 6.7 G | Refills: 12 | Status: SHIPPED | OUTPATIENT
Start: 2021-04-28

## 2021-04-28 RX ORDER — HYDROCODONE BITARTRATE AND ACETAMINOPHEN 7.5; 3 MG/1; MG/1
1 TABLET ORAL EVERY 6 HOURS
COMMUNITY
Start: 2021-03-15 | End: 2021-04-28

## 2021-04-28 NOTE — PROGRESS NOTES
Subjective   Brooklyn Galvan is a 81 y.o. female. Presents today for   Chief Complaint   Patient presents with   • Follow-up     states o2 level has been low       Shortness of Breath  This is a recurrent problem. The current episode started in the past 7 days. The problem occurs constantly. The problem has been waxing and waning. Pertinent negatives include no chest pain, orthopnea or PND. Associated symptoms comments: Oxygen lower at home, but recheck ok. Her past medical history is significant for CAD and a heart failure. There is no history of COPD.   former smoker debora andrew 10 years;  Did lose  and sister.  No focal neuro deficits;  Is due for thyroid check by endocrinology.    Has dm2 and due for a1c, currently diet controlled;  Ha hypothyroidism on levohtyroixine and experiencing fatigue, is due for las as well for endocinrology.  Review of Systems   Respiratory: Positive for shortness of breath.    Cardiovascular: Negative for chest pain, orthopnea and PND.       Patient Active Problem List   Diagnosis   • Vitamin D deficiency   • Anemia of chronic renal failure   • Benign essential hypertension   • Chronic kidney disease, stage V (CMS/HCC)   • Constipation   • Type 2 diabetes mellitus with chronic kidney disease on chronic dialysis, without long-term current use of insulin (CMS/HCC)   • Dyslipidemia   • Generalized anxiety disorder   • Low back pain   • Post-menopausal osteoporosis   • End-stage renal disease on hemodialysis (CMS/HCC)   • Paroxysmal atrial fibrillation (CMS/HCC)   • Coronary artery disease involving native coronary artery   • Chronic anticoagulation   • Non-rheumatic mitral regurgitation   • Pulmonary hypertension (CMS/HCC)   • ANDREA (obstructive sleep apnea)   • Anxiety   • Adiposity   • Osteomyelitis of metatarsal (CMS/HCC)   • ESRD (end stage renal disease) (CMS/HCC)   • Diabetic ulcer of toe of right foot (CMS/HCC)   • Disorder of phosphorus metabolism   • Followed by palliative  care service   • Generalized weakness   • Healthcare-associated pneumonia   • MRSA bacteremia   • Primary hyperparathyroidism (CMS/[facility])   • Sepsis (CMS/[facility])   • Supratherapeutic INR   • Acquired absence of other left toe(s) (CMS/[facility])   • Allergy, unspecified, initial encounter   • Anaphylactic shock, unspecified, initial encounter   • Aphasia   • Calcium deposit in bursa, multiple sites   • Chest pain, unspecified   • Chronic systolic (congestive) heart failure (CMS/[facility])   • Complication of AV dialysis fistula   • Dependence on other enabling machines and devices   • Dependence on renal dialysis (CMS/[facility])   • Encounter for change or removal of nonsurgical wound dressing   • Encounter for immunization   • Headache, unspecified   • Hyperkalemia   • Hyperlipidemia, unspecified   • Hypothyroidism, unspecified   • Iron deficiency anemia   • Long term (current) use of opiate analgesic   • Other nonrheumatic mitral valve disorders   • Other specified coagulation defects (CMS/[facility])   • Other specified complication of vascular prosthetic devices, implants and grafts, initial encounter (CMS/[facility])   • Peripheral vascular disease, unspecified (CMS/[facility])   • Personal history of brain cancer   • Personal history of diabetic foot ulcer   • Presence of spectacles and contact lenses   • Secondary hyperparathyroidism of renal origin (CMS/[facility])   • Shortness of breath   • Unspecified protein-calorie malnutrition (CMS/[facility])   • Volume overload   • Infection of dialysis vascular access (CMS/[facility])   • Obesity, unspecified   • Anemia in chronic kidney disease   • End stage renal disease (CMS/[facility])   • Benign essential HTN   • Long term (current) use of anticoagulants   • Hypertensive chronic kidney disease with stage 1 through stage 4 chronic kidney disease, or unspecified chronic kidney disease   • Diarrhea, unspecified   • Coronary artery disease   • Hypocalcemia   • Other disorders of calcium metabolism   • Sleep apnea   • Paroxysmal atrial  fibrillation (CMS/[facility])   • Diabetes mellitus due to underlying condition with diabetic nephropathy (CMS/[facility])   • Diabetes mellitus (CMS/[facility])   • Hypoglycemia, unspecified   • Type 2 diabetes mellitus with other diabetic kidney complication (CMS/[facility])       Social History     Socioeconomic History   • Marital status:      Spouse name: Not on file   • Number of children: Not on file   • Years of education: Not on file   • Highest education level: Not on file   Tobacco Use   • Smoking status: Former Smoker     Packs/day: 0.10     Types: Cigarettes     Start date: 1953     Quit date: 1981     Years since quittin.3   • Smokeless tobacco: Never Used   Substance and Sexual Activity   • Alcohol use: Defer     Comment: denied   • Drug use: Never       Allergies   Allergen Reactions   • Iodinated Diagnostic Agents Hives   • Penicillins Swelling and Unknown - High Severity   • Acetaminophen-Codeine    • Codeine Unknown - High Severity   • Dextromethorphan Unknown - High Severity   • Phenothiazines Unknown - High Severity   • Sulfa Antibiotics Unknown - High Severity   • Acetaminophen Anxiety and Unknown - High Severity   • Promethazine Anxiety       Current Outpatient Medications on File Prior to Visit   Medication Sig Dispense Refill   • amiodarone (PACERONE) 200 MG tablet Take 100 mg by mouth Daily.     • atorvastatin (LIPITOR) 10 MG tablet Take 10 mg by mouth daily. ONE QD FOR CHOLESTEROL & HEART HEALTH     • b complex-C-folic acid 1 MG capsule Take 1 capsule by mouth daily.     • bimatoprost (LUMIGAN) 0.01 % ophthalmic drops Apply 1 drop to eye daily.     • dorzolamide-timolol (COSOPT) 22.3-6.8 MG/ML ophthalmic solution      • HYDROcodone-acetaminophen (NORCO)  MG per tablet Take 1 tablet by mouth Every 4 (Four) Hours As Needed for Moderate Pain  or Severe Pain . 90 tablet 0   • hydrocortisone 2.5 % ointment Wipe twice daily with pea sized amount 60 g 2   • levothyroxine (SYNTHROID, LEVOTHROID) 50  "MCG tablet Take 50 mcg by mouth Daily.     • LORazepam (ATIVAN) 1 MG tablet TAKE ONE-HALF TO ONE TABLET BY MOUTH THREE TIMES A DAY AS NEEDED FOR ANXIETY 90 tablet 1   • midodrine (PROAMATINE) 5 MG tablet Take 1 tablet by mouth As Needed.     • ondansetron ODT (ZOFRAN-ODT) 4 MG disintegrating tablet DISSOLVE ONE TABLET BY MOUTH EVERY 8 HOURS AS NEEDED FOR NAUSEA AND VOMITING 21 tablet 0   • PARoxetine (PAXIL) 30 MG tablet Take 1.5 tablets by mouth Every Morning. PLEASE CALL THE OFFICE FOR AN APPT 45 tablet 0   • sevelamer (RENVELA) 800 MG tablet Take 800 mg by mouth 3 (Three) Times a Day With Meals.     • sodium bicarbonate 650 MG tablet Take 650 mg by mouth 4 (Four) Times a Day.     • STOOL SOFTENER 100 MG capsule TAKE ONE CAPSULE BY MOUTH TWICE A DAY 60 capsule 7   • timolol (TIMOPTIC) 0.25 % ophthalmic solution 1 drop 2 (Two) Times a Day.     • vitamin D (ERGOCALCIFEROL) 22501 UNITS capsule capsule Take 50,000 Units by mouth 1 (One) Time Per Week.     • [DISCONTINUED] ondansetron ODT (ZOFRAN-ODT) 4 MG disintegrating tablet Take 1 tablet by mouth Every 8 (Eight) Hours As Needed for Nausea or Vomiting. 21 tablet 0     No current facility-administered medications on file prior to visit.       Objective   Vitals:    04/28/21 0953   BP: 112/64   Pulse: 78   SpO2: 95%   Weight: Comment: uable to obtain   Height: 165.1 cm (65\")   PainSc: 0-No pain     Body mass index is 31.45 kg/m².    Physical Exam  Vitals and nursing note reviewed.   Constitutional:       Appearance: She is well-developed.   HENT:      Head: Normocephalic and atraumatic.   Neck:      Thyroid: No thyromegaly.      Vascular: No JVD.   Cardiovascular:      Rate and Rhythm: Normal rate and regular rhythm.      Heart sounds: Normal heart sounds. No murmur heard.   No friction rub. No gallop.    Pulmonary:      Effort: Pulmonary effort is normal. No respiratory distress.      Breath sounds: Normal breath sounds. No wheezing or rales.   Abdominal:      " General: Bowel sounds are normal. There is no distension.      Palpations: Abdomen is soft.      Tenderness: There is no abdominal tenderness. There is no guarding or rebound.   Musculoskeletal:      Cervical back: Neck supple.   Skin:     General: Skin is warm and dry.   Neurological:      Mental Status: She is lethargic.   Psychiatric:         Speech: Speech is delayed.         Behavior: Behavior normal.         Assessment/Plan   Diagnoses and all orders for this visit:    1. Other fatigue (Primary)  -     Hemoglobin A1c  -     T4, Free  -     T3, Free  -     C-reactive Protein  -     Sedimentation Rate  -     CBC & Differential    2. Delirium    3. Shortness of breath  -     albuterol sulfate  (90 Base) MCG/ACT inhaler; Inhale 2 puffs Every 4 (Four) Hours As Needed for Wheezing or Shortness of Air.  Dispense: 6.7 g; Refill: 12    4. Type 2 diabetes mellitus with chronic kidney disease on chronic dialysis, without long-term current use of insulin (CMS/[facility])  -     Hemoglobin A1c  -     Lipid Panel    -ok inhaler for dyspnea;  Lungs appear clear and Sao2 ok;  Just had dialysis;  Due for labs and will check to see if cause of somnolence and change;  Has [facility], will see if can draw, if not have done at dialysis  -no other sign or symptoms of infction, no fevers;  Not in volume overload on exa and just had dialysis  -go er if worsening  -dm2 - due for las, will check  -has lab order for TSH, will add on         -Follow up: 3 to 4 weeks and prn

## 2021-04-29 ENCOUNTER — TELEPHONE (OUTPATIENT)
Dept: FAMILY MEDICINE CLINIC | Facility: CLINIC | Age: 81
End: 2021-04-29

## 2021-04-29 NOTE — TELEPHONE ENCOUNTER
ELIAZAR A HOME HEALTH NURSE WITH [facility] RECEIVED AN ORDER FROM DR. DEMPSEY TO RETRIEVE SOME LABS ON PATIENT HOWEVER ELIAZAR WAS UNABLE TO BECAUSE PATIENT IS IN DIALYSIS AND VERY LIMITED ON HER ABILITY TO GET A SUCCESSFUL STICK.. THERE WAS VERY MINIMAL PLACES ELIAZAR COULD TRY AND IT WAS HER LEFT ARM WHICH WAS A ALL ONE HUGE NASTY BRUISE.     ELIAZAR SUGGESTS SENDING AN ORDER TO PATIENTS DIALYSIS OFFICE AND HAVING THEM RETRIEVE THE LABS AS THEY ARE CONSTANTLY ABLE TO SUCCESSFULLY STICK PATIENT.     ELIAZAR CAN BE REACHED AT: [phone]

## 2023-10-30 NOTE — TELEPHONE ENCOUNTER
6/5/20  
except right shoulder NT due to sx./bilateral UE Active ROM was WFL  (within functional limits)

## 2025-05-27 NOTE — PROGRESS NOTES
Clinic Care Coordination Contact  [facility]/Voicemail    Clinical Data: Care Coordinator Outreach    Outreach Documentation Number of Outreach Attempt   4/28/2025  10:16 AM 1   5/7/2025   3:42 PM 2   5/27/2025   3:55 PM 1     RN CC attempt to reach out to patient to follow up with pt understanding of insurance coverage provider options   Pt was going to check with her insurance carrier for Nephrology providers who are in network   Left message to request her to call back CCC team if she needs our assistance with follow up       Left message on patient's voicemail with call back information and requested return call.      Plan: Patient will schedule and attend recommended follow up visits with speciality providers and primary care provider.  Community Health Worker to outreach per standard work and updated on goal progression  RN CC will review in 4-6 weeks to support ongoing recommendations and plan of care will be available sooner if needed.     Subjective   Brooklyn Galvan is a 80 y.o. female. Presents today for No chief complaint on file.    This visit has been rescheduled as a phone visit to comply with patient safety concerns in accordance with CDC recommendations. Total time of discussion was 12 minutes.  Patient Telephone VISIT, patient gave consent to treat.      History of Present Illness  Patient on chronic dialysis tiw and reports back pain after sitting for so long;  No pain down legs; no falls or trauma, but would liek try MR or something when get home to help.  She also reports having some external hemorrhoids, burns/irritated when wipes trace blood on toilet paper;  Some constipation.  NO gross blood, no abd pain;  No n/v;  She has also anxiety with panic disorder, needs refill of ativan;  She is aware of serious risks but is tolerating.    Review of Systems    Patient Active Problem List   Diagnosis   • Vitamin D deficiency   • Anemia of chronic renal failure   • Benign essential hypertension   • Chronic kidney disease, stage V (CMS/HCC)   • Constipation   • Type 2 diabetes mellitus with chronic kidney disease on chronic dialysis, without long-term current use of insulin (CMS/HCC)   • Dyslipidemia   • Generalized anxiety disorder   • Low back pain   • Post-menopausal osteoporosis   • End-stage renal disease on hemodialysis (CMS/HCC)   • Paroxysmal atrial fibrillation (CMS/HCC)   • Coronary artery disease involving native coronary artery   • Chronic anticoagulation   • Non-rheumatic mitral regurgitation   • Pulmonary hypertension (CMS/HCC)   • ANDREA (obstructive sleep apnea)   • Nonischemic cardiomyopathy (CMS/HCC)   • Anxiety   • Adiposity   • Osteomyelitis of metatarsal (CMS/HCC)       Social History     Socioeconomic History   • Marital status:      Spouse name: Not on file   • Number of children: Not on file   • Years of education: Not on file   • Highest education level: Not on file   Tobacco Use   • Smoking status: Former Smoker    • Smokeless tobacco: Never Used   Substance and Sexual Activity   • Alcohol use: Defer     Comment: denied       Allergies   Allergen Reactions   • Iodinated Diagnostic Agents Hives   • Penicillins Swelling   • Acetaminophen-Codeine    • Sulfa Antibiotics    • Acetaminophen Anxiety   • Promethazine Anxiety       Current Outpatient Medications on File Prior to Visit   Medication Sig Dispense Refill   • amiodarone (PACERONE) 200 MG tablet Take 100 mg by mouth Daily.     • atorvastatin (LIPITOR) 10 MG tablet Take 10 mg by mouth daily. ONE QD FOR CHOLESTEROL & HEART HEALTH     • b complex-C-folic acid 1 MG capsule Take 1 capsule by mouth daily.     • bimatoprost (LUMIGAN) 0.01 % ophthalmic drops Apply 1 drop to eye daily.     • HYDROcodone-acetaminophen (NORCO)  MG per tablet Take 1 tablet by mouth Every 4 (Four) Hours As Needed for Moderate Pain  or Severe Pain . 90 tablet 0   • levothyroxine (SYNTHROID, LEVOTHROID) 50 MCG tablet Take 50 mcg by mouth Daily.     • LORazepam (ATIVAN) 1 MG tablet 1/2-1 po bid prn anxiety 60 tablet 1   • ondansetron ODT (ZOFRAN-ODT) 4 MG disintegrating tablet Take 1 tablet by mouth Every 8 (Eight) Hours As Needed for Nausea or Vomiting. 21 tablet 0   • PARoxetine (PAXIL) 40 MG tablet TAKE ONE AND ONE-HALF (1 & 1/2) TABLET BY MOUTH DAILY 90 tablet 5   • sevelamer (RENVELA) 800 MG tablet Take 800 mg by mouth 3 (Three) Times a Day With Meals.     • sodium bicarbonate 650 MG tablet Take 650 mg by mouth 4 (Four) Times a Day.     • STOOL SOFTENER 100 MG capsule TAKE ONE CAPSULE BY MOUTH TWICE A DAY 60 capsule 8   • timolol (TIMOPTIC) 0.25 % ophthalmic solution 1 drop 2 (Two) Times a Day.     • vitamin D (ERGOCALCIFEROL) 83183 UNITS capsule capsule Take 50,000 Units by mouth 1 (One) Time Per Week.     • warfarin (COUMADIN) 3 MG tablet TAKE ONE TABLET BY MOUTH DAILY AS DIRECTED BY PRESCRIBER 60 tablet 4   • warfarin (COUMADIN) 5 MG tablet        No current facility-administered medications  on file prior to visit.        Objective   There were no vitals filed for this visit.  There is no height or weight on file to calculate BMI.    Physical Exam   Psychiatric: She has a normal mood and affect. Her behavior is normal. Judgment and thought content normal.       Assessment/Plan   Diagnoses and all orders for this visit:    Anxiety  -     LORazepam (ATIVAN) 1 MG tablet; 1/2-1 po bid prn anxiety    External hemorrhoid  -     hydrocortisone 2.5 % ointment; Wipe bottom twice daily with pea sized amount    Chronic bilateral low back pain without sciatica  -     baclofen (LIORESAL) 10 MG tablet; 1/2 to daily prn back pain after dialysis    -will try low dose baclofen after dialysis  -caution with bzd use as addictive potential and sedating  -recommend miralax daily and will give HC oint as helped in past         -Follow up: 6 mnoths and prn